# Patient Record
Sex: FEMALE | Race: WHITE | NOT HISPANIC OR LATINO | Employment: OTHER | URBAN - METROPOLITAN AREA
[De-identification: names, ages, dates, MRNs, and addresses within clinical notes are randomized per-mention and may not be internally consistent; named-entity substitution may affect disease eponyms.]

---

## 2023-04-24 ENCOUNTER — PATIENT OUTREACH (OUTPATIENT)
Dept: CARE COORDINATION | Facility: CLINIC | Age: 78
End: 2023-04-24
Payer: MEDICARE

## 2023-04-24 ENCOUNTER — DOCUMENTATION (OUTPATIENT)
Dept: PRIMARY CARE | Facility: CLINIC | Age: 78
End: 2023-04-24
Payer: MEDICARE

## 2023-04-24 RX ORDER — FUROSEMIDE 40 MG/1
40 TABLET ORAL DAILY
COMMUNITY

## 2023-07-01 LAB
ALBUMIN (G/DL) IN SER/PLAS: 3.6 G/DL (ref 3.4–5)
ANION GAP IN SER/PLAS: 17 MMOL/L (ref 10–20)
CALCIUM (MG/DL) IN SER/PLAS: 8.9 MG/DL (ref 8.6–10.3)
CARBON DIOXIDE, TOTAL (MMOL/L) IN SER/PLAS: 27 MMOL/L (ref 21–32)
CHLORIDE (MMOL/L) IN SER/PLAS: 100 MMOL/L (ref 98–107)
CREATININE (MG/DL) IN SER/PLAS: 1.48 MG/DL (ref 0.5–1.05)
GFR FEMALE: 36 ML/MIN/1.73M2
GLUCOSE (MG/DL) IN SER/PLAS: 231 MG/DL (ref 74–99)
PHOSPHATE (MG/DL) IN SER/PLAS: 1.9 MG/DL (ref 2.5–4.9)
POTASSIUM (MMOL/L) IN SER/PLAS: 3.6 MMOL/L (ref 3.5–5.3)
SODIUM (MMOL/L) IN SER/PLAS: 140 MMOL/L (ref 136–145)
UREA NITROGEN (MG/DL) IN SER/PLAS: 34 MG/DL (ref 6–23)

## 2024-01-23 ENCOUNTER — TELEMEDICINE (OUTPATIENT)
Dept: GASTROENTEROLOGY | Facility: CLINIC | Age: 79
End: 2024-01-23
Payer: MEDICARE

## 2024-01-23 DIAGNOSIS — K21.9 GASTROESOPHAGEAL REFLUX DISEASE WITHOUT ESOPHAGITIS: ICD-10-CM

## 2024-01-23 DIAGNOSIS — K74.60 CIRRHOSIS OF LIVER WITHOUT ASCITES, UNSPECIFIED HEPATIC CIRRHOSIS TYPE (MULTI): Primary | ICD-10-CM

## 2024-01-23 PROCEDURE — 99213 OFFICE O/P EST LOW 20 MIN: CPT | Performed by: INTERNAL MEDICINE

## 2024-01-23 ASSESSMENT — ENCOUNTER SYMPTOMS
DECREASED APPETITE: 0
SHORTNESS OF BREATH: 0
ABDOMINAL PAIN: 0

## 2024-01-23 NOTE — PROGRESS NOTES
REASON FOR VISIT:  follow up for cirrhosis, colon polyps, GERD    HPI:  Krea Gonsales is a 78 y.o. female who presents for    Hx of PANIAGUA Cirrhosis, colon polyps , GERD  She has fallen in the past month  - she had one episode of where she felt dizzy after trying to drink liquid - she reports she was drinking a lot when it happened - she has had some regurgitation. Advised to drink a few sips at time    She had blood work drawn at [facility] [place] -  not avail to view    MRI pancreas 06/2023- no new liver lesions, Pancreas stable         Med list notable for    Labs notable for    No fhx of CRC.       Prev endoscopic eval: colonoscopy 06/2015    REVIEW OF SYSTEMS    Review of Systems   Constitutional: Negative for decreased appetite.   Cardiovascular:  Negative for chest pain.   Respiratory:  Negative for shortness of breath.    Gastrointestinal:  Negative for abdominal pain.          Not on File    Past Medical History:   Diagnosis Date    Acute bronchitis, unspecified 01/13/2021    Acute bronchitis    Acute sinusitis, unspecified 04/14/2020    Acute sinusitis    Cervicalgia 10/15/2019    Neck pain    Decreased white blood cell count, unspecified     Leukocytopenia    Deficiency of other specified B group vitamins 05/02/2022    Vitamin B12 deficiency    Essential (primary) hypertension 05/02/2022    Benign essential hypertension    Hyperlipidemia, unspecified 05/02/2022    Hyperlipidemia    Hypersecretion of calcitonin     Hypercalcitonemia    Laceration without foreign body of unspecified hand, initial encounter     Laceration of hand    Nonalcoholic steatohepatitis (PANIAGUA)     Steatohepatitis, nonalcoholic    Obesity, unspecified 02/19/2013    Obesity    Other fatigue 09/18/2020    Fatigue, unspecified type    Other muscle spasm 11/02/2016    Muscle spasm    Pain in unspecified knee 02/19/2013    Joint pain, knee    Personal history of colonic polyps     History of adenomatous polyp of colon    Personal history of  diseases of the skin and subcutaneous tissue     History of eczema    Personal history of other diseases of the digestive system     History of hiatal hernia    Personal history of other diseases of urinary system     History of hematuria    Personal history of other endocrine, nutritional and metabolic disease     History of hyperglycemia    Personal history of other infectious and parasitic diseases     History of hepatitis    Personal history of other mental and behavioral disorders 06/03/2014    History of attention deficit disorder    Personal history of other specified conditions     History of splenomegaly    Personal history of peptic ulcer disease     Personal history of gastric ulcer    Sciatica, unspecified side 02/19/2013    Sciatica    Strain of muscle, fascia and tendon of lower back, initial encounter 06/03/2014    Lumbar strain    Syncope and collapse     Vasovagal syncope    Type 2 diabetes mellitus without complications (CMS/[facility]) 05/02/2022    Diabetes mellitus    Unspecified macular degeneration     Macular degeneration    Vitamin D deficiency, unspecified 05/02/2022    Vitamin D deficiency       Past Surgical History:   Procedure Laterality Date    CHOLECYSTECTOMY  04/23/2014    Cholecystectomy    DILATION AND CURETTAGE OF UTERUS  06/03/2014    Dilation And Curettage    OTHER SURGICAL HISTORY  06/03/2014    Biopsy Of Liver    TONSILLECTOMY  04/23/2014    Tonsillectomy    TOTAL ABDOMINAL HYSTERECTOMY  04/23/2014    Total Abdominal Hysterectomy    TOTAL ABDOMINAL HYSTERECTOMY W/ BILATERAL SALPINGOOPHORECTOMY  04/23/2014    Salpingo-oophorectomy Bilateral       No family history on file.    Social History     Tobacco Use    Smoking status: Not on file    Smokeless tobacco: Not on file   Substance Use Topics    Alcohol use: Not on file       Current Outpatient Medications   Medication Sig Dispense Refill    furosemide (Lasix) 40 mg tablet Take 1 tablet (40 mg) by mouth once daily.       No current  facility-administered medications for this visit.       PHYSICAL EXAM:  There were no vitals taken for this visit.     Physical Exam     ASSESSMENT  Cirrhosis - she has PANIAGUA - stable compensated disease. She is due for AFP and US for cirrhosis screening.  She can hold off on further colon cancer surveillance - last one 2015, EGD 2018    Spoke on the phone -visit for 12 minutes      Evaluation requested by Dr. Christie Baez MD.   My final recommendations will be communicated back to the requesting physician by way of shared Medical record or letter to requesting physician via fax.      Attending Note:   I have personally performed a face to face assessment of this Patient, which included an interview, physical exam and Assessment and Plan.  I have reviewed and confirmed the history and key findings as documented by the Medical Assistant and edited as appropriate.     Signature: Alina Esteves MD    Date: 1/23/2024  Time: 7:49 AM

## 2024-03-05 ENCOUNTER — APPOINTMENT (OUTPATIENT)
Dept: RADIOLOGY | Facility: CLINIC | Age: 79
End: 2024-03-05
Payer: MEDICARE

## 2024-03-12 ENCOUNTER — HOSPITAL ENCOUNTER (OUTPATIENT)
Dept: RADIOLOGY | Facility: CLINIC | Age: 79
Discharge: HOME | End: 2024-03-12
Payer: MEDICARE

## 2024-03-12 DIAGNOSIS — K74.60 CIRRHOSIS OF LIVER WITHOUT ASCITES, UNSPECIFIED HEPATIC CIRRHOSIS TYPE (MULTI): ICD-10-CM

## 2024-03-12 PROCEDURE — 76705 ECHO EXAM OF ABDOMEN: CPT

## 2024-03-12 PROCEDURE — 76705 ECHO EXAM OF ABDOMEN: CPT | Performed by: RADIOLOGY

## 2024-03-15 ENCOUNTER — TELEPHONE (OUTPATIENT)
Dept: GASTROENTEROLOGY | Facility: CLINIC | Age: 79
End: 2024-03-15
Payer: MEDICARE

## 2024-03-15 NOTE — TELEPHONE ENCOUNTER
----- Message from Alina ARTEAGA MD sent at 3/14/2024  3:28 PM EDT -----  Please call the patient regarding her US result. No evidence of liver lesions - repeat in 6 months to a year - keep appt

## 2024-07-09 PROBLEM — I10 BENIGN ESSENTIAL HYPERTENSION: Status: ACTIVE | Noted: 2021-03-13

## 2024-07-09 PROBLEM — L70.9 ACNE: Status: ACTIVE | Noted: 2024-07-09

## 2024-07-09 PROBLEM — N39.0 URINARY TRACT INFECTION: Status: ACTIVE | Noted: 2024-07-09

## 2024-07-09 PROBLEM — E53.8 COBALAMIN DEFICIENCY: Status: ACTIVE | Noted: 2024-07-09

## 2024-07-09 PROBLEM — I10 HYPERTENSION: Status: ACTIVE | Noted: 2024-07-09

## 2024-07-09 PROBLEM — R50.9 FEVER: Status: ACTIVE | Noted: 2024-07-09

## 2024-07-09 PROBLEM — M15.9 GENERALIZED OSTEOARTHRITIS OF MULTIPLE SITES: Status: ACTIVE | Noted: 2024-07-09

## 2024-07-09 PROBLEM — R94.30 ELEVATED RIGHT VENTRICULAR END-DIASTOLIC PRESSURE: Status: ACTIVE | Noted: 2024-07-09

## 2024-07-09 PROBLEM — M62.838 MUSCLE SPASM: Status: ACTIVE | Noted: 2024-07-09

## 2024-07-09 PROBLEM — E07.0: Status: ACTIVE | Noted: 2024-07-09

## 2024-07-09 PROBLEM — K75.81 NONALCOHOLIC STEATOHEPATITIS (NASH): Status: ACTIVE | Noted: 2021-03-13

## 2024-07-09 PROBLEM — H81.10 BENIGN POSITIONAL VERTIGO: Status: ACTIVE | Noted: 2024-07-09

## 2024-07-09 PROBLEM — M62.81 MUSCLE WEAKNESS (GENERALIZED): Status: ACTIVE | Noted: 2021-03-13

## 2024-07-09 PROBLEM — Q76.1 CERVICAL FUSION SYNDROME: Status: ACTIVE | Noted: 2024-07-09

## 2024-07-09 PROBLEM — I82.210: Status: ACTIVE | Noted: 2021-03-13

## 2024-07-09 PROBLEM — R53.81 MALAISE AND FATIGUE: Status: ACTIVE | Noted: 2024-07-09

## 2024-07-09 PROBLEM — M79.671 PAIN OF RIGHT HEEL: Status: ACTIVE | Noted: 2024-07-09

## 2024-07-09 PROBLEM — R20.0 NUMBNESS OF FACE: Status: ACTIVE | Noted: 2024-07-09

## 2024-07-09 PROBLEM — E55.9 VITAMIN D DEFICIENCY: Status: ACTIVE | Noted: 2021-03-13

## 2024-07-09 PROBLEM — R16.1 SPLENOMEGALY: Status: ACTIVE | Noted: 2024-07-09

## 2024-07-09 PROBLEM — Z86.010 HISTORY OF ADENOMATOUS POLYP OF COLON: Status: ACTIVE | Noted: 2024-07-09

## 2024-07-09 PROBLEM — R19.7 DIARRHEA: Status: ACTIVE | Noted: 2024-07-09

## 2024-07-09 PROBLEM — N18.4 CKD (CHRONIC KIDNEY DISEASE), STAGE IV (MULTI): Status: ACTIVE | Noted: 2024-07-09

## 2024-07-09 PROBLEM — G93.31 POSTVIRAL FATIGUE SYNDROME: Status: ACTIVE | Noted: 2024-07-09

## 2024-07-09 PROBLEM — E78.00 HIGH CHOLESTEROL: Status: ACTIVE | Noted: 2024-07-09

## 2024-07-09 PROBLEM — I99.8 VASCULAR INSUFFICIENCY: Status: ACTIVE | Noted: 2024-07-09

## 2024-07-09 PROBLEM — R63.0 LACK OF APPETITE: Status: ACTIVE | Noted: 2024-07-09

## 2024-07-09 PROBLEM — M54.2 NECK PAIN: Status: ACTIVE | Noted: 2024-07-09

## 2024-07-09 PROBLEM — M19.90 ARTHRITIS: Status: ACTIVE | Noted: 2024-07-09

## 2024-07-09 PROBLEM — R22.0 SCALP LUMP: Status: ACTIVE | Noted: 2024-07-09

## 2024-07-09 PROBLEM — J82.83 EOSINOPHILIC ASTHMA (HHS-HCC): Status: ACTIVE | Noted: 2024-07-09

## 2024-07-09 PROBLEM — R31.9 BLOOD IN URINE: Status: ACTIVE | Noted: 2024-07-09

## 2024-07-09 PROBLEM — K21.9 GERD (GASTROESOPHAGEAL REFLUX DISEASE): Status: ACTIVE | Noted: 2024-07-09

## 2024-07-09 PROBLEM — R30.0 DYSURIA: Status: ACTIVE | Noted: 2024-07-09

## 2024-07-09 PROBLEM — R68.2 DRY MOUTH: Status: ACTIVE | Noted: 2024-07-09

## 2024-07-09 PROBLEM — R25.1 TREMOR OF LEFT HAND: Status: ACTIVE | Noted: 2024-07-09

## 2024-07-09 PROBLEM — S80.01XA CONTUSION OF RIGHT KNEE: Status: ACTIVE | Noted: 2021-03-13

## 2024-07-09 PROBLEM — M54.41 CHRONIC LOW BACK PAIN WITH RIGHT-SIDED SCIATICA: Status: ACTIVE | Noted: 2024-07-09

## 2024-07-09 PROBLEM — N18.4: Status: ACTIVE | Noted: 2024-01-04

## 2024-07-09 PROBLEM — S39.012A LUMBAR STRAIN: Status: ACTIVE | Noted: 2024-07-09

## 2024-07-09 PROBLEM — Z86.0101 HISTORY OF ADENOMATOUS POLYP OF COLON: Status: ACTIVE | Noted: 2024-07-09

## 2024-07-09 PROBLEM — I25.10 NONOBSTRUCTIVE ATHEROSCLEROSIS OF CORONARY ARTERY: Status: ACTIVE | Noted: 2024-07-09

## 2024-07-09 PROBLEM — R13.10 DYSPHAGIA: Status: ACTIVE | Noted: 2024-07-09

## 2024-07-09 PROBLEM — R07.89 PRESSURE IN LEFT SIDE OF CHEST: Status: ACTIVE | Noted: 2024-07-09

## 2024-07-09 PROBLEM — R53.83 MALAISE AND FATIGUE: Status: ACTIVE | Noted: 2024-07-09

## 2024-07-09 PROBLEM — S20.211A CONTUSION OF RIB ON RIGHT SIDE: Status: ACTIVE | Noted: 2024-07-09

## 2024-07-09 PROBLEM — M25.559 ARTHRALGIA OF HIP: Status: ACTIVE | Noted: 2024-07-09

## 2024-07-09 PROBLEM — R92.8 FOLLOW-UP EXAMINATION OF ABNORMAL MAMMOGRAM: Status: ACTIVE | Noted: 2024-07-09

## 2024-07-09 PROBLEM — H35.30 MACULAR DEGENERATION: Status: ACTIVE | Noted: 2024-07-09

## 2024-07-09 PROBLEM — A04.5 CAMPYLOBACTER DIARRHEA: Status: ACTIVE | Noted: 2024-07-09

## 2024-07-09 PROBLEM — R60.0 EDEMA OF LOWER EXTREMITY: Status: ACTIVE | Noted: 2024-07-09

## 2024-07-09 PROBLEM — H26.9 CATARACT OF BOTH EYES: Status: ACTIVE | Noted: 2024-07-09

## 2024-07-09 PROBLEM — M94.251: Status: ACTIVE | Noted: 2024-07-09

## 2024-07-09 PROBLEM — R23.3 BRUISES EASILY: Status: ACTIVE | Noted: 2024-07-09

## 2024-07-09 PROBLEM — M79.621 PAIN IN RIGHT AXILLA: Status: ACTIVE | Noted: 2024-07-09

## 2024-07-09 PROBLEM — I27.0 PRIMARY PULMONARY HYPERTENSION (MULTI): Status: ACTIVE | Noted: 2024-07-09

## 2024-07-09 PROBLEM — I50.9 CONGESTIVE HEART FAILURE (MULTI): Status: ACTIVE | Noted: 2023-04-28

## 2024-07-09 PROBLEM — R06.89 HEAVY BREATHING: Status: ACTIVE | Noted: 2024-07-09

## 2024-07-09 PROBLEM — D12.6 ADENOMATOUS COLON POLYP: Status: ACTIVE | Noted: 2024-07-09

## 2024-07-09 PROBLEM — D72.819 LEUKOPENIA: Status: ACTIVE | Noted: 2024-07-09

## 2024-07-09 PROBLEM — E11.9 DIABETES MELLITUS (MULTI): Status: ACTIVE | Noted: 2024-07-09

## 2024-07-09 PROBLEM — I77.9: Status: ACTIVE | Noted: 2024-07-09

## 2024-07-09 PROBLEM — R53.83 FATIGUE: Status: ACTIVE | Noted: 2024-07-09

## 2024-07-09 PROBLEM — G89.29 CHRONIC LOW BACK PAIN WITH RIGHT-SIDED SCIATICA: Status: ACTIVE | Noted: 2024-07-09

## 2024-07-09 PROBLEM — R07.9 ACUTE CHEST PAIN: Status: ACTIVE | Noted: 2024-07-09

## 2024-07-09 PROBLEM — J18.9 PNEUMONIA: Status: ACTIVE | Noted: 2024-07-09

## 2024-07-09 PROBLEM — Z86.2 HISTORY OF BLOOD DISORDER: Status: ACTIVE | Noted: 2024-07-09

## 2024-07-09 PROBLEM — H66.91 RIGHT OTITIS MEDIA: Status: ACTIVE | Noted: 2024-07-09

## 2024-07-09 PROBLEM — K76.6 PORTAL HYPERTENSION (MULTI): Status: ACTIVE | Noted: 2024-07-09

## 2024-07-09 PROBLEM — H61.21 IMPACTED CERUMEN OF RIGHT EAR: Status: ACTIVE | Noted: 2024-07-09

## 2024-07-09 PROBLEM — U07.1 PNEUMONIA DUE TO COVID-19 VIRUS: Status: ACTIVE | Noted: 2024-07-09

## 2024-07-09 PROBLEM — J20.9 ACUTE BRONCHITIS: Status: ACTIVE | Noted: 2024-07-09

## 2024-07-09 PROBLEM — Z20.822 SUSPECTED SEVERE ACUTE RESPIRATORY SYNDROME CORONAVIRUS 2 (SARS-COV-2) INFECTION: Status: ACTIVE | Noted: 2024-07-09

## 2024-07-09 PROBLEM — J12.82 PNEUMONIA DUE TO COVID-19 VIRUS: Status: ACTIVE | Noted: 2024-07-09

## 2024-07-09 PROBLEM — M48.061 LUMBAR SPINAL STENOSIS: Status: ACTIVE | Noted: 2024-07-09

## 2024-07-09 PROBLEM — I25.9 CHRONIC ISCHEMIC HEART DISEASE: Status: ACTIVE | Noted: 2024-07-09

## 2024-07-09 PROBLEM — R26.2 DIFFICULTY IN WALKING, NOT ELSEWHERE CLASSIFIED: Status: ACTIVE | Noted: 2021-03-13

## 2024-07-09 PROBLEM — S89.91XD: Status: ACTIVE | Noted: 2021-03-13

## 2024-07-09 PROBLEM — H61.23 BILATERAL HEARING LOSS DUE TO CERUMEN IMPACTION: Status: ACTIVE | Noted: 2024-07-09

## 2024-07-09 PROBLEM — S61.419A LACERATION OF HAND: Status: ACTIVE | Noted: 2024-07-09

## 2024-07-09 PROBLEM — S80.10XA CONTUSION OF LOWER EXTREMITY: Status: ACTIVE | Noted: 2024-07-09

## 2024-07-09 PROBLEM — R06.09 EXERTIONAL DYSPNEA: Status: ACTIVE | Noted: 2024-07-09

## 2024-07-09 PROBLEM — E66.9 OBESITY: Status: ACTIVE | Noted: 2024-07-09

## 2024-07-09 PROBLEM — M85.80 OSTEOPENIA: Status: ACTIVE | Noted: 2024-07-09

## 2024-07-09 PROBLEM — R60.0 EDEMA OF BOTH LEGS: Status: ACTIVE | Noted: 2024-07-09

## 2024-07-09 PROBLEM — I83.893 VARICOSE VEINS OF BILATERAL LOWER EXTREMITIES WITH OTHER COMPLICATIONS: Status: ACTIVE | Noted: 2024-07-09

## 2024-07-09 PROBLEM — E86.0 DEHYDRATION: Status: ACTIVE | Noted: 2024-07-09

## 2024-07-09 PROBLEM — D69.6 LOW PLATELET COUNT (CMS-HCC): Status: ACTIVE | Noted: 2024-07-09

## 2024-07-09 PROBLEM — T50.A95A: Status: ACTIVE | Noted: 2024-07-09

## 2024-07-09 PROBLEM — H91.90 HEARING LOSS: Status: ACTIVE | Noted: 2024-07-09

## 2024-07-09 PROBLEM — I25.10 CORONARY ARTERY DISEASE: Status: ACTIVE | Noted: 2024-07-09

## 2024-07-09 PROBLEM — K59.00 CONSTIPATION: Status: ACTIVE | Noted: 2024-07-09

## 2024-07-09 PROBLEM — Z86.16 HISTORY OF COVID-19: Status: ACTIVE | Noted: 2024-07-09

## 2024-07-09 PROBLEM — R55 VASOVAGAL SYNCOPE: Status: ACTIVE | Noted: 2024-07-09

## 2024-07-09 PROBLEM — R25.1 INTERMITTENT TREMOR: Status: ACTIVE | Noted: 2024-07-09

## 2024-07-29 NOTE — PROGRESS NOTES
REASON FOR VISIT:  Cirrhosis, colon polyps , GERD      HPI:  Kera Gonsales is a 79 y.o. female who presents for follow up     Hx of PANIAGUA , colon polyps, GERD - imaging at recent hospitalization was ok - no liver lesions, +varices  She is continuing to have some regurgitation with liquids   Pt takes tums for GERD daily  She is on farxiga for blood sugar control - this is causing some constipation - may contribute to weight loss  Currently moving bowels 1x per day - she takes 1 capful of  miralax daily   Denies BRB, melena or mucous   Recent fall - admitted to [facility] 7/22        Med list notable for    Labs notable for    No fhx of CRC.       Prev endoscopic eval:   Colonoscopy - 2015  EGD 2018     REVIEW OF SYSTEMS    Review of Systems   Cardiovascular:  Negative for chest pain.   Respiratory:  Negative for cough and shortness of breath.    Gastrointestinal:  Positive for abdominal pain, constipation, dysphagia and heartburn. Negative for bloating, diarrhea, hematemesis, hematochezia, hemorrhoids, jaundice, melena, nausea and vomiting.          Allergies   Allergen Reactions    Amoxicillin Other    Codeine GI Upset    Cortisone Rash       Past Medical History:   Diagnosis Date    Acute bronchitis, unspecified 01/13/2021    Acute bronchitis    Acute sinusitis, unspecified 04/14/2020    Acute sinusitis    Cervicalgia 10/15/2019    Neck pain    Decreased white blood cell count, unspecified     Leukocytopenia    Deficiency of other specified B group vitamins 05/02/2022    Vitamin B12 deficiency    Essential (primary) hypertension 05/02/2022    Benign essential hypertension    Hyperlipidemia, unspecified 05/02/2022    Hyperlipidemia    Hypersecretion of calcitonin     Hypercalcitonemia    Laceration without foreign body of unspecified hand, initial encounter     Laceration of hand    Nonalcoholic steatohepatitis (PANIAGUA)     Steatohepatitis, nonalcoholic    Obesity, unspecified 02/19/2013    Obesity     Other fatigue 09/18/2020    Fatigue, unspecified type    Other muscle spasm 11/02/2016    Muscle spasm    Pain in unspecified knee 02/19/2013    Joint pain, knee    Personal history of colonic polyps     History of adenomatous polyp of colon    Personal history of diseases of the skin and subcutaneous tissue     History of eczema    Personal history of other diseases of the digestive system     History of hiatal hernia    Personal history of other diseases of urinary system     History of hematuria    Personal history of other endocrine, nutritional and metabolic disease     History of hyperglycemia    Personal history of other infectious and parasitic diseases     History of hepatitis    Personal history of other mental and behavioral disorders 06/03/2014    History of attention deficit disorder    Personal history of other specified conditions     History of splenomegaly    Personal history of peptic ulcer disease     Personal history of gastric ulcer    Sciatica, unspecified side 02/19/2013    Sciatica    Strain of muscle, fascia and tendon of lower back, initial encounter 06/03/2014    Lumbar strain    Syncope and collapse     Vasovagal syncope    Type 2 diabetes mellitus without complications (Multi) 05/02/2022    Diabetes mellitus    Unspecified macular degeneration     Macular degeneration    Vitamin D deficiency, unspecified 05/02/2022    Vitamin D deficiency       Past Surgical History:   Procedure Laterality Date    CHOLECYSTECTOMY  04/23/2014    Cholecystectomy    DILATION AND CURETTAGE OF UTERUS  06/03/2014    Dilation And Curettage    OTHER SURGICAL HISTORY  06/03/2014    Biopsy Of Liver    TONSILLECTOMY  04/23/2014    Tonsillectomy    TOTAL ABDOMINAL HYSTERECTOMY  04/23/2014    Total Abdominal Hysterectomy    TOTAL ABDOMINAL HYSTERECTOMY W/ BILATERAL SALPINGOOPHORECTOMY  04/23/2014    Salpingo-oophorectomy Bilateral       No family history on file.    Social History     Tobacco Use    Smoking status:  Not on file    Smokeless tobacco: Not on file   Substance Use Topics    Alcohol use: Not on file       Current Outpatient Medications   Medication Sig Dispense Refill    furosemide (Lasix) 40 mg tablet Take 1 tablet (40 mg) by mouth once daily.       No current facility-administered medications for this visit.       PHYSICAL EXAM:  There were no vitals taken for this visit.     Physical Exam  Constitutional:       Comments: Awake   HENT:      Head: Normocephalic.   Cardiovascular:      Rate and Rhythm: Normal rate and regular rhythm.   Pulmonary:      Effort: Pulmonary effort is normal.      Breath sounds: Normal breath sounds.   Abdominal:      General: Bowel sounds are normal.      Palpations: Abdomen is soft.   Neurological:      Mental Status: She is alert.   Psychiatric:         Mood and Affect: Mood normal.          ASSESSMENT  79-year-old female presents for follow-up of longstanding [place] cirrhosis and history of colon polyps and heartburn.  She has previously been diagnosed with pulmonary hypertension.  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  She currently lives at home alone but has neighbors that keeps track of her.  She fell trying to reach for something at night and fell on her walker.  She has multiple bruises across her face chin and ear.  She was recently admitted to the hospital at [facility] for possible pneumonia.  Her cirrhosis seems to be well compensated.  Labs and imaging were unremarkable at the outside hospital.  She has lost some weight which she attributes to being on the Farxiga and eating better.  I am concerned about her living alone but she does not want to address any other living situations.  She we will continue her diet.  I have asked her to increase her medicine to omeprazole 20 mg rather than Tums daily since she is having some regurgitation and heartburn symptoms.  I will see her in follow-up in 6 months      Evaluation requested by Dr. Christie Baez MD.   My final  recommendations will be communicated back to the requesting physician by way of shared Medical record or letter to requesting physician via fax.      Attending Note:   I have personally performed a face to face assessment of this Patient, which included an interview, physical exam and Assessment and Plan.  I have reviewed and confirmed the history and key findings as documented by the Medical Assistant and edited as appropriate.     Signature: Alina Esteves MD    Date: 7/29/2024  Time: 10:19 AM

## 2024-07-30 ENCOUNTER — APPOINTMENT (OUTPATIENT)
Dept: GASTROENTEROLOGY | Facility: CLINIC | Age: 79
End: 2024-07-30
Payer: MEDICARE

## 2024-07-30 VITALS
HEART RATE: 72 BPM | BODY MASS INDEX: 23.73 KG/M2 | RESPIRATION RATE: 18 BRPM | DIASTOLIC BLOOD PRESSURE: 64 MMHG | SYSTOLIC BLOOD PRESSURE: 137 MMHG | TEMPERATURE: 98.8 F | HEIGHT: 64 IN | WEIGHT: 139 LBS | OXYGEN SATURATION: 94 %

## 2024-07-30 DIAGNOSIS — K59.03 DRUG-INDUCED CONSTIPATION: ICD-10-CM

## 2024-07-30 DIAGNOSIS — K74.60 CIRRHOSIS OF LIVER WITHOUT ASCITES, UNSPECIFIED HEPATIC CIRRHOSIS TYPE (MULTI): Primary | ICD-10-CM

## 2024-07-30 DIAGNOSIS — K21.9 GASTROESOPHAGEAL REFLUX DISEASE WITHOUT ESOPHAGITIS: ICD-10-CM

## 2024-07-30 PROCEDURE — 3075F SYST BP GE 130 - 139MM HG: CPT | Performed by: INTERNAL MEDICINE

## 2024-07-30 PROCEDURE — 1036F TOBACCO NON-USER: CPT | Performed by: INTERNAL MEDICINE

## 2024-07-30 PROCEDURE — 99214 OFFICE O/P EST MOD 30 MIN: CPT | Performed by: INTERNAL MEDICINE

## 2024-07-30 PROCEDURE — 3078F DIAST BP <80 MM HG: CPT | Performed by: INTERNAL MEDICINE

## 2024-07-30 PROCEDURE — 1159F MED LIST DOCD IN RCRD: CPT | Performed by: INTERNAL MEDICINE

## 2024-07-30 RX ORDER — SPIRONOLACTONE 50 MG/1
TABLET, FILM COATED ORAL
COMMUNITY
Start: 2024-07-02

## 2024-07-30 RX ORDER — DAPAGLIFLOZIN 10 MG/1
10 TABLET, FILM COATED ORAL
COMMUNITY
Start: 2024-07-13

## 2024-07-30 RX ORDER — SILDENAFIL CITRATE 20 MG/1
2 TABLET ORAL
COMMUNITY
Start: 2024-07-08

## 2024-07-30 RX ORDER — OMEPRAZOLE 20 MG/1
20 CAPSULE, DELAYED RELEASE ORAL
Qty: 90 CAPSULE | Refills: 3 | Status: SHIPPED | OUTPATIENT
Start: 2024-07-30 | End: 2025-07-30

## 2024-07-30 RX ORDER — TORSEMIDE 20 MG/1
TABLET ORAL
COMMUNITY
Start: 2024-07-08

## 2024-07-30 RX ORDER — MONTELUKAST SODIUM 10 MG/1
TABLET ORAL
COMMUNITY
Start: 2024-07-02

## 2024-07-30 ASSESSMENT — ENCOUNTER SYMPTOMS
DIARRHEA: 0
HEMATEMESIS: 0
HEARTBURN: 1
VOMITING: 0
BLOATING: 0
JAUNDICE: 0
CONSTIPATION: 1
SHORTNESS OF BREATH: 0
ABDOMINAL PAIN: 1
COUGH: 0
NAUSEA: 0
HEMATOCHEZIA: 0

## 2024-09-24 ENCOUNTER — TELEPHONE (OUTPATIENT)
Dept: GASTROENTEROLOGY | Facility: CLINIC | Age: 79
End: 2024-09-24
Payer: MEDICARE

## 2024-09-24 NOTE — TELEPHONE ENCOUNTER
Patient called in. She was in [facility] ER. She wants to know if you should see her sooner than January?

## 2024-10-09 ENCOUNTER — TELEPHONE (OUTPATIENT)
Dept: GASTROENTEROLOGY | Facility: CLINIC | Age: 79
End: 2024-10-09
Payer: MEDICARE

## 2024-12-26 PROBLEM — I50.33 ACUTE ON CHRONIC DIASTOLIC (CONGESTIVE) HEART FAILURE: Status: ACTIVE | Noted: 2024-12-26

## 2024-12-26 PROBLEM — Z59.6 LOW INCOME: Status: ACTIVE | Noted: 2024-12-26

## 2024-12-26 PROBLEM — K52.9 COLITIS: Status: ACTIVE | Noted: 2024-09-22

## 2024-12-26 PROBLEM — M79.674 PAIN IN TOES OF BOTH FEET: Status: ACTIVE | Noted: 2024-12-26

## 2024-12-26 PROBLEM — K57.92 DIVERTICULITIS: Status: ACTIVE | Noted: 2024-09-22

## 2024-12-26 PROBLEM — E11.29: Status: ACTIVE | Noted: 2024-12-26

## 2024-12-26 PROBLEM — L60.8 ACQUIRED DEFORMITY OF NAIL: Status: ACTIVE | Noted: 2024-12-26

## 2024-12-26 PROBLEM — M18.10 DEGENERATIVE ARTHRITIS OF THUMB: Status: ACTIVE | Noted: 2024-12-26

## 2024-12-26 PROBLEM — M79.675 PAIN IN TOES OF BOTH FEET: Status: ACTIVE | Noted: 2024-12-26

## 2024-12-26 PROBLEM — B35.1 ONYCHOMYCOSIS DUE TO DERMATOPHYTE: Status: ACTIVE | Noted: 2024-12-26

## 2024-12-26 PROBLEM — G93.2 BENIGN INTRACRANIAL HYPERTENSION: Status: ACTIVE | Noted: 2024-12-26

## 2024-12-26 PROBLEM — M47.816 LUMBAR SPONDYLOSIS: Status: ACTIVE | Noted: 2024-12-26

## 2024-12-26 PROBLEM — E11.42 POLYNEUROPATHY DUE TO TYPE 2 DIABETES MELLITUS (MULTI): Status: ACTIVE | Noted: 2024-12-26
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
## 2024-12-26 RX ORDER — LANCETS
EACH MISCELLANEOUS
COMMUNITY
Start: 2024-06-22

## 2024-12-26 RX ORDER — ATENOLOL 25 MG/1
TABLET ORAL
COMMUNITY
Start: 2024-07-02

## 2024-12-26 RX ORDER — BLOOD SUGAR DIAGNOSTIC
STRIP MISCELLANEOUS
COMMUNITY
Start: 2024-06-22

## 2024-12-26 RX ORDER — POLYETHYLENE GLYCOL 3350 17 G/17G
POWDER, FOR SOLUTION ORAL
COMMUNITY
Start: 2024-06-25

## 2024-12-26 RX ORDER — LACTULOSE 10 G/15ML
SOLUTION ORAL
COMMUNITY
Start: 2024-05-10

## 2024-12-26 RX ORDER — GLIPIZIDE 5 MG/1
5 TABLET ORAL
COMMUNITY
Start: 2024-08-08 | End: 2025-02-24

## 2025-01-28 ENCOUNTER — APPOINTMENT (OUTPATIENT)
Dept: GASTROENTEROLOGY | Facility: CLINIC | Age: 80
End: 2025-01-28
Payer: MEDICARE

## 2025-01-28 VITALS
OXYGEN SATURATION: 96 % | HEIGHT: 64 IN | HEART RATE: 75 BPM | TEMPERATURE: 96.8 F | DIASTOLIC BLOOD PRESSURE: 58 MMHG | BODY MASS INDEX: 27.49 KG/M2 | SYSTOLIC BLOOD PRESSURE: 126 MMHG | RESPIRATION RATE: 22 BRPM | WEIGHT: 161 LBS

## 2025-01-28 DIAGNOSIS — K75.81 NONALCOHOLIC STEATOHEPATITIS (NASH): ICD-10-CM

## 2025-01-28 DIAGNOSIS — K74.69 OTHER CIRRHOSIS OF LIVER: Primary | ICD-10-CM

## 2025-01-28 DIAGNOSIS — K21.9 GASTROESOPHAGEAL REFLUX DISEASE WITHOUT ESOPHAGITIS: ICD-10-CM

## 2025-01-28 PROCEDURE — G2211 COMPLEX E/M VISIT ADD ON: HCPCS | Performed by: INTERNAL MEDICINE

## 2025-01-28 PROCEDURE — 1159F MED LIST DOCD IN RCRD: CPT | Performed by: INTERNAL MEDICINE

## 2025-01-28 PROCEDURE — 3078F DIAST BP <80 MM HG: CPT | Performed by: INTERNAL MEDICINE

## 2025-01-28 PROCEDURE — 99214 OFFICE O/P EST MOD 30 MIN: CPT | Performed by: INTERNAL MEDICINE

## 2025-01-28 PROCEDURE — 3074F SYST BP LT 130 MM HG: CPT | Performed by: INTERNAL MEDICINE

## 2025-01-28 ASSESSMENT — ENCOUNTER SYMPTOMS
COUGH: 0
CONSTIPATION: 0
WEIGHT LOSS: 0
BLOATING: 0
HEMATOCHEZIA: 0
HEARTBURN: 0
SHORTNESS OF BREATH: 0
WEIGHT GAIN: 1
HEMATEMESIS: 0
ABDOMINAL PAIN: 0
DIARRHEA: 0
VOMITING: 0
NAUSEA: 0
DECREASED APPETITE: 0

## 2025-01-28 NOTE — PROGRESS NOTES
REASON FOR VISIT:  PANIAGUA, colon polyps, GERD   HPI:  Kera Gonsales is a 79 y.o. female who presents for a follow up appointment     She was admitted to [facility] x 3 days for constipation . She was disimpacted   Currently moving bowels daily if she takes miralax .   Denies BRB, melena, or mucous   Denies abdominal pain   Lower extremity swelling  Denies heartburn symptoms - takes omeprazole prn          Med list notable for miralax , omeprazole prn     Labs notable for    No fhx of CRC.       Prev endoscopic eval:   Colonoscopy - 2015  EGD 2018   REVIEW OF SYSTEMS    Review of Systems   Constitutional: Positive for weight gain. Negative for decreased appetite and weight loss.   Cardiovascular:  Negative for chest pain.   Respiratory:  Negative for cough and shortness of breath.    Gastrointestinal:  Negative for bloating, abdominal pain, constipation, diarrhea, dysphagia, heartburn, hematemesis, hematochezia, hemorrhoids, nausea and vomiting.          Allergies   Allergen Reactions    Amoxicillin Other    Codeine GI Upset    Nitroglycerin Other     Bradycardia     Cortisone Rash    Latex Rash       Past Medical History:   Diagnosis Date    Acute bronchitis, unspecified 01/13/2021    Acute bronchitis    Acute sinusitis, unspecified 04/14/2020    Acute sinusitis    Cervicalgia 10/15/2019    Neck pain    Decreased white blood cell count, unspecified     Leukocytopenia    Deficiency of other specified B group vitamins 05/02/2022    Vitamin B12 deficiency    Essential (primary) hypertension 05/02/2022    Benign essential hypertension    Hyperlipidemia, unspecified 05/02/2022    Hyperlipidemia    Hypersecretion of calcitonin     Hypercalcitonemia    Laceration without foreign body of unspecified hand, initial encounter     Laceration of hand    Nonalcoholic steatohepatitis (PANIAGUA)     Steatohepatitis, nonalcoholic    Obesity, unspecified 02/19/2013    Obesity    Other fatigue 09/18/2020    Fatigue,  unspecified type    Other muscle spasm 11/02/2016    Muscle spasm    Pain in unspecified knee 02/19/2013    Joint pain, knee    Personal history of colonic polyps     History of adenomatous polyp of colon    Personal history of diseases of the skin and subcutaneous tissue     History of eczema    Personal history of other diseases of the digestive system     History of hiatal hernia    Personal history of other diseases of urinary system     History of hematuria    Personal history of other endocrine, nutritional and metabolic disease     History of hyperglycemia    Personal history of other infectious and parasitic diseases     History of hepatitis    Personal history of other mental and behavioral disorders 06/03/2014    History of attention deficit disorder    Personal history of other specified conditions     History of splenomegaly    Personal history of peptic ulcer disease     Personal history of gastric ulcer    Sciatica, unspecified side 02/19/2013    Sciatica    Strain of muscle, fascia and tendon of lower back, initial encounter 06/03/2014    Lumbar strain    Syncope and collapse     Vasovagal syncope    Type 2 diabetes mellitus without complications (Multi) 05/02/2022    Diabetes mellitus    Unspecified macular degeneration     Macular degeneration    Vitamin D deficiency, unspecified 05/02/2022    Vitamin D deficiency       Past Surgical History:   Procedure Laterality Date    CHOLECYSTECTOMY  04/23/2014    Cholecystectomy    DILATION AND CURETTAGE OF UTERUS  06/03/2014    Dilation And Curettage    OTHER SURGICAL HISTORY  06/03/2014    Biopsy Of Liver    TONSILLECTOMY  04/23/2014    Tonsillectomy    TOTAL ABDOMINAL HYSTERECTOMY  04/23/2014    Total Abdominal Hysterectomy    TOTAL ABDOMINAL HYSTERECTOMY W/ BILATERAL SALPINGOOPHORECTOMY  04/23/2014    Salpingo-oophorectomy Bilateral       No family history on file.    Social History     Tobacco Use    Smoking status: Never    Smokeless tobacco: Never    Substance Use Topics    Alcohol use: Yes     Comment: 3x per year       Current Outpatient Medications   Medication Sig Dispense Refill    Accu-Chek Bozena Plus test strp strip       Accu-Chek Softclix Lancets misc       atenolol (Tenormin) 25 mg tablet       Constulose 10 gram/15 mL oral solution take 15 mL ORAL TWICE DAILY x30 days as needed for constipation      Farxiga 10 mg 1 tablet (10 mg).      finerenone (Kerendia) 10 mg tablet tablet Take 1 tablet (10 mg) by mouth once daily.      furosemide (Lasix) 40 mg tablet Take 1 tablet (40 mg) by mouth once daily.      glipiZIDE (Glucotrol) 5 mg tablet 1 tablet (5 mg).      montelukast (Singulair) 10 mg tablet       omeprazole (PriLOSEC) 20 mg DR capsule Take 1 capsule (20 mg) by mouth once daily in the morning. Take before meals. Do not crush or chew. 90 capsule 3    polyethylene glycol (Glycolax, Miralax) 17 gram/dose powder Take 17g ORAL DAILY for 30 days,Instrdissolve in water before taking.      sildenafil (Revatio) 20 mg tablet Take 2 tablets (40 mg) by mouth 3 times a day.      spironolactone (Aldactone) 50 mg tablet       torsemide (Demadex) 20 mg tablet        No current facility-administered medications for this visit.       PHYSICAL EXAM:  There were no vitals taken for this visit.     Physical Exam  Constitutional:       Comments: Awake   HENT:      Head: Normocephalic.   Cardiovascular:      Rate and Rhythm: Normal rate and regular rhythm.   Pulmonary:      Effort: Pulmonary effort is normal.      Breath sounds: Normal breath sounds.   Abdominal:      General: Bowel sounds are normal.      Palpations: Abdomen is soft.   Neurological:      Mental Status: She is alert.   Psychiatric:         Mood and Affect: Mood normal.          ASSESSMENT  79-year-old female who has been followed in GI clinic for multiple GI issues.  She was diagnosed with [place] cirrhosis many years ago and has undergone surveillance ultrasound and alpha-fetoprotein periodically.  Her  last ultrasound was unremarkable for any liver lesions her last alpha-fetoprotein was in 2022 that was unremarkable.  She has multiple medical issues including coronary artery disease, pulmonary hypertension, chronic kidney disease recently diagnosed as stage IV but improved to stage III, diabetes, hyperlipidemia, hypertension.  She was on Farxiga which she felt was causing constipation and due to cost issues she discontinued it.  She was admitted to [facility] With obstipation that required disimpaction.  Her last colonoscopy was in 2015 and was unremarkable however she does have a history of polyps.  She was advised to start MiraLAX which has helped her constipation.  She denies other alarm symptoms  Given her multiple medical issues, living alone and using a walker I am concerned about proceeding with a colonoscopy to assess the constipation.  Was initially felt to be related to the Farxiga but since stopping it she has not seen significant improvement in her constipation.  She has noted regular bowel movements on the MiraLAX.  For now I would hold off on pursuing any invasive procedures.  We will check an alpha-fetoprotein and ultrasound given that her last one was a few years ago.    I will see her in follow-up in 6 months      Evaluation requested by Dr. Christie Baez MD.   My final recommendations will be communicated back to the requesting physician by way of shared Medical record or letter to requesting physician via fax.      Attending Note:   I have personally performed a face to face assessment of this Patient, which included an interview, physical exam and Assessment and Plan.  I have reviewed and confirmed the history and key findings as documented by the Medical Assistant and edited as appropriate.     Signature: Alina Esteves MD    Date: 1/28/2025  Time: 2:25 PM

## 2025-02-07 LAB — NON-UH HIE AFP TM: 5 NG/ML (ref 0–8)

## 2025-02-18 ENCOUNTER — TELEPHONE (OUTPATIENT)
Dept: GASTROENTEROLOGY | Facility: CLINIC | Age: 80
End: 2025-02-18
Payer: MEDICARE

## 2025-02-18 NOTE — TELEPHONE ENCOUNTER
Spoke with patient. She is aware that results of AFP are WNL. She states she will have the ultrasound at [facility] in March due to weather.

## 2025-02-18 NOTE — TELEPHONE ENCOUNTER
----- Message from Alina Esteves sent at 2/16/2025  8:53 PM EST -----  The AFP is normal. Please complete the ultrasound to complete the cirrhosis screening. Keep your follow up as scheduled.

## 2025-02-20 ENCOUNTER — APPOINTMENT (OUTPATIENT)
Dept: RADIOLOGY | Facility: CLINIC | Age: 80
End: 2025-02-20
Payer: MEDICARE

## 2025-03-19 ENCOUNTER — HOSPITAL ENCOUNTER (OUTPATIENT)
Dept: RADIOLOGY | Facility: CLINIC | Age: 80
Discharge: HOME | End: 2025-03-19
Payer: MEDICARE

## 2025-03-19 DIAGNOSIS — K74.69 OTHER CIRRHOSIS OF LIVER: ICD-10-CM

## 2025-03-19 DIAGNOSIS — K75.81 NONALCOHOLIC STEATOHEPATITIS (NASH): ICD-10-CM

## 2025-03-19 PROCEDURE — 76705 ECHO EXAM OF ABDOMEN: CPT

## 2025-03-21 ENCOUNTER — TELEPHONE (OUTPATIENT)
Dept: GASTROENTEROLOGY | Facility: CLINIC | Age: 80
End: 2025-03-21
Payer: MEDICARE

## 2025-03-21 NOTE — TELEPHONE ENCOUNTER
----- Message from Alina Esteves sent at 3/21/2025 12:40 PM EDT -----  Please call - US is stable, pancreatic cyst is stable - plan for repeat in 6 months. Keep appt as scheduled.    Ftsg Text: The defect edges were debeveled with a #15 scalpel blade.  Given the location of the defect, shape of the defect and the proximity to free margins a full thickness skin graft was deemed most appropriate.  Using a sterile surgical marker, the primary defect shape was transferred to the donor site. The area thus outlined was incised deep to adipose tissue with a #15 scalpel blade.  The harvested graft was then trimmed of adipose tissue until only dermis and epidermis was left.  The skin margins of the secondary defect were undermined to an appropriate distance in all directions utilizing iris scissors.  The secondary defect was closed with interrupted buried subcutaneous sutures.  The skin edges were then re-apposed with running  sutures.  The skin graft was then placed in the primary defect and oriented appropriately.

## 2025-04-03 ENCOUNTER — NURSING HOME VISIT (OUTPATIENT)
Dept: POST ACUTE CARE | Facility: EXTERNAL LOCATION | Age: 80
End: 2025-04-03
Payer: MEDICARE

## 2025-04-03 DIAGNOSIS — N18.30 STAGE 3 CHRONIC KIDNEY DISEASE, UNSPECIFIED WHETHER STAGE 3A OR 3B CKD (MULTI): ICD-10-CM

## 2025-04-03 DIAGNOSIS — K76.6 PORTAL HYPERTENSION (MULTI): ICD-10-CM

## 2025-04-03 DIAGNOSIS — I50.9 CONGESTIVE HEART FAILURE, UNSPECIFIED HF CHRONICITY, UNSPECIFIED HEART FAILURE TYPE: ICD-10-CM

## 2025-04-03 DIAGNOSIS — S32.82XD MULTIPLE CLOSED FRACTURES OF PELVIS WITHOUT DISRUPTION OF PELVIC RING WITH ROUTINE HEALING, SUBSEQUENT ENCOUNTER: Primary | ICD-10-CM

## 2025-04-03 DIAGNOSIS — D69.3 CHRONIC IDIOPATHIC THROMBOCYTOPENIA (MULTI): ICD-10-CM

## 2025-04-03 DIAGNOSIS — K74.69 OTHER CIRRHOSIS OF LIVER: ICD-10-CM

## 2025-04-03 DIAGNOSIS — R55 VASOVAGAL SYNCOPE: ICD-10-CM

## 2025-04-03 DIAGNOSIS — E11.9 CONTROLLED TYPE 2 DIABETES MELLITUS WITHOUT COMPLICATION, WITHOUT LONG-TERM CURRENT USE OF INSULIN: ICD-10-CM

## 2025-04-03 PROCEDURE — 99497 ADVNCD CARE PLAN 30 MIN: CPT | Performed by: EMERGENCY MEDICINE

## 2025-04-03 PROCEDURE — 99306 1ST NF CARE HIGH MDM 50: CPT | Performed by: EMERGENCY MEDICINE

## 2025-04-03 NOTE — LETTER
Patient: Kera Gonsales  : 1945    Encounter Date: 2025    Kera Gonsales   79 y.o.  female  @location@            Assessment and Plan:  History and physical    Mechanical fall with left inferior pubic rami and sacral and acetabulum fractures (Multiple pelvic fractures)  Vasovagal syncope after dulcolax suppository placed  Chest pain likely due to hypoxemia from IV narcotics  Hypotension due to narcotics  Hypokalemia  NIDDM  Chronic thrombocytopenia  Pulmonary HTN  Right heart failure  Portal hypertension  Liver cirrhosis secondary to PANIAGUA  Coronary artery disease  CKD  Hypertension  Constipation  DVT prophylaxis with SCDs      Resume torsemide 20 mg bid; monitor bp  Nonsurgical treatment. Recommended WBAT with walker per ortho consult. PT/OT. Rec repeat xrays in 10-14 days  Telemetry  analgesics prn, decreased to 1 tablet  replace K prn  Antiemetics prn  Consult cardiology appreciated  Continue PHTN meds  Hold torsemide for now due to hypotension; can likely resume tomorrow. BP improved.  Cr at baseline   Home meds per clinical course  monitor labs   miralax daily to avoid constipation while on narcotics  PT/OT rec mod intensity     Discharge Medications   New acetaminophen-oxycodone (acetaminophen-oxycodone 325 mg-5 mg oral tablet = Percocet)1 Tabs Oral EVERY FOUR HOURS as needed Moderate Pain for 7 Days. Refills: 0. docusate (Colace 100 mg oral capsule)1 Capsules Oral DAILY. insulin regular (NovoLIN R 100 units/mL injectable solution)Mild Scale Subcutaneous WITH MEALS AND AT BEDTIME. << Sliding Scale Comments >>  0 Units; 151-200 2 Units; 201-250 4 Units; 251-300 6 Units; 301-350 8 Units; 351-400 10 Units; Greater than 400 12 Units << Sliding Scale Comments >>. pantoprazole (Protonix 40 mg oral delayed release tablet)1 Tabs Oral DAILY.   Changed polyethylene glycol 3350 (MiraLax oral powder for reconstitution)17 Gram Oral DAILY for 31 Days. dissolve in 4 to 8 oz of beverage. Refills:  3.   Unchanged calcitriol (Rocaltrol 0.25 mcg oral capsule)1 Capsules Oral DAILY. ferrous sulfate ( iron ) (Slow Release Iron=Slow Fe 140 mg (45 mg elemental iron) oral tablet, extended release)1 Tabs Oral MON,WED,FRI. Refills: 3. finerenone (Kerendia 20 mg oral tablet)1 Tabs Oral DAILY. montelukast (Singulair 10 mg oral tablet)1 Tabs Oral EVERY EVENING. Refills: 3. multivitamin with minerals (PreserVision AREDS 2 oral capsule)1 Capsules Oral TWICE A DAY. potassium chloride (KCL) (Potassium Chloride (Eqv-Klor-Con M10) 10 mEq oral tablet, extended release)1 Tabs Oral DAILY. Prescription DME (accu check fredi plus strips)test once a day. Refills: 3. Prescription DME (accu echek softclix lancets)test once a day. Refills: 3. Prescription DME (HANDICAP PLACARD)DURATION OF 5 YEARS. Refills: 0. sildenafil = Revatio (sildenafil 20 mg oral tablet)2 Tabs Oral THREE TIMES A DAY. torsemide = Demadex (torsemide 20 mg oral tablet)1 Tabs Oral TWICE A DAY.   Discontinued glimepiride (glimepiride 1 mg oral tablet)1 Tabs Oral DAILY for 90 Days. replaces glipizide. Refills: 3. traMADol (traMADol 50 mg oral tablet)take ONE-HALF TABLET BY MOUTH EVERY 8 HOURS FOR 6 DAYS AS NEEDED FOR ARTHRITIS pain. Refills: 0. Hospital Course     79-year-old female with history of pulmonary hypertension, chronic thrombocytopenia, liver cirrhosis secondary to PANIAGUA with portal hypertension, coronary disease, chronic kidney disease and diabetes was admitted after mechanical fall at home sustaining left inferior pubic rami and sacral and acetabulum fractures. She was hypoxic after morphine in the ER requiring nasal cannula. She had an episode of vasovagal syncope on the floor after Dulcolax suppository was placed. Diuretics were held for a few days initialing of her hospitalization. Torsemide was eventually resumed. She was seen by orthopedic surgeon and allowed weightbearing as tolerated with walker. Therapy recommended skilled nursing facility and she was  discharged there.    I discussed advanced care planning for more than 16 minutes including the explanation and discussion of advanced directives. Information and advise was also provided on DO NOT RESUSCITATE and patient encouraged to consider this  Patient is not sure about DNR at this time.      Source of history: Nurse, Medical personnel, Medical record, Patient.  History limitation: None.    HPI:  History and physical    Patient is unable to give any detailed history and therefore history is obtained from the chart  No acute complaints voiced by the patient or acute concerns raised by nursing    History of hospitalization-    Chief Complaint hip pain, chest tightness History of Present Illness 79 year old female was trying to put her walker away after physical therapy when it got away from her and in reaching for it she fell, landing onto her left hip. She had immediate pain and could not get up. She denied head injury, neck pain or loss of consciousness. She received morphine for pain and was complaining of chest tightness, dizziness, nausea and thirstiness when I saw her. Sats were in the upper 80's and improved to 90's on 2 liters nasal cannula and chest tightness had improved. She has chronic thrombocytopenia history and pulmonary htn and follows with Dr. Peace.     Problem List/Past Medical History Ongoing Acute-on-chronic kidney injury Adverse reaction to pneumococcal vaccine Anemia Arthritis Bruising tendency Cataract associated with type 2 diabetes mellitus Cataracts Cervical fusion syndrome Cervical spine arthritis CHF (congestive heart failure) Chronic constipation Chronic kidney disease Chronic kidney disease stage 4 Chronic sinusitis CKD (chronic kidney disease), stage IV Coronary artery disease Degenerative arthritis of thumb DISH (diffuse idiopathic skeletal hyperostosis) Eosinophilic asthma Fall at home Gastroesophageal reflux disease Hearing loss High cholesterol History of adenomatous polyp  of colon History of COVID-19 Impacted cerumen of right ear Intermittent asthma Leukopenia Liver cirrhosis secondary to PANIAGUA, advanced Loss of appetite Low income Lumbar and sacral spondyloarthritis Lumbar spinal stenosis Lumbar spondylosis Nocturnal hypoxemia Nonobstructive atherosclerosis of coronary artery Onychomycosis due to dermatophyte Osteoporosis Otalgia Pain of toes of bilateral feet Pancreatic lesion Parathyroid disease Pneumonia due to COVID-19 virus Polyneuropathy due to type 2 diabetes mellitus Portal hypertension Pulmonary arterial hypertension Right heart failure Right rotator cuff tendinitis 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[employer] Highest education:Some college   Exercise - Occasional exercise Times per week:1-2 times/week Exercise type:chair exercise class   Home/Environment Lives with:Alone *Living Situation Prior to AdmissionHome/Independent Home equipment:None, Walker/Cane Monitoring Equipment in homeGlucose monitoring *Special  Services and Community Resources Prior to AdmissionNone *Mobility Assistance Prior to AdmissionIndependent Home BarriersNone *Will patient require additional/new services upon discharge?No   Nutrition/Health Type of diet:Regular AppetiteFair Feeding AssistanceIndependent   Other Name:Daily Caffeine Details:Consumes on average 1 cup of regular coffee per day - alternating with tea Consumes on average 1 cup of hot tea per day - alternating with coffee Consumes on average 32oz of iced tea per day - alternating with soda Consumes on average 32oz of soda per   Substance Abuse - Denies Substance Abuse   Tobacco - Denies Tobacco Use Use:Never (less than 100 in lifetime) Family History Cardiac disease.: Mother. Gastric cancer: Brother. Heart attack.: Brother. Heart disease: Mother.Negative: Father. Heart failure.: Mother. Stroke.: Father. Health Status Family Member(s)  Family Member(s) Relationship: Father, Age: Unknown, Cause: Brain Hemorrhage Relationship: Mother, Age: Unknown   Current Outpatient Medications   Medication Sig Dispense Refill   • Accu-Chek Bozena Plus test strp strip      • Accu-Chek Softclix Lancets misc      • atenolol (Tenormin) 25 mg tablet  (Patient not taking: Reported on 2025)     • Constulose 10 gram/15 mL oral solution take 15 mL ORAL TWICE DAILY x30 days as needed for constipation (Patient not taking: Reported on 2025)     • Farxiga 10 mg 1 tablet (10 mg). (Patient not taking: Reported on 2025)     • finerenone (Kerendia) 10 mg tablet tablet Take 1 tablet (10 mg) by mouth once daily.     • furosemide (Lasix) 40 mg tablet Take 1 tablet (40 mg) by mouth once daily. (Patient not taking: Reported on 2025)     • glipiZIDE (Glucotrol) 5 mg tablet 1 tablet (5 mg).     • montelukast (Singulair) 10 mg tablet      • omeprazole (PriLOSEC) 20 mg DR capsule Take 1 capsule (20 mg) by mouth once daily in the morning. Take before meals. Do not crush or chew. 90 capsule 3   •  polyethylene glycol (Glycolax, Miralax) 17 gram/dose powder Take 17g ORAL DAILY for 30 days,Instrdissolve in water before taking.     • sildenafil (Revatio) 20 mg tablet Take 2 tablets (40 mg) by mouth 3 times a day.     • spironolactone (Aldactone) 50 mg tablet  (Patient not taking: Reported on 1/28/2025)     • torsemide (Demadex) 20 mg tablet        No current facility-administered medications for this visit.       Physical Exam:  Vital signs as per nursing/MA documentation were reviewed  General appearance: Alert and in no acute distress  HEENT: Normal Inspection  Neck - Normal Inspection  Respiratory : No respiratory distress. Lungs are clear   Cardiovascular: heart rate normal. No gallop  Back - normal inspection  Skin inspection:Warm  Musculoskeletal : No deformities  Neuro : Limited exam. Baseline    ROS: Review of symptoms is negative except for what is mentioned in HPI    Results/Data  Records including but not limited to electronic medical records, relevant lab work and imaging from patient's health care facility encounter were reviewed and independently verified      Charting was completed using voice recognition technology and may include unintended errors.    Discussed with patient/family, RN, and NP.      Electronically Signed By: Noe Mccartney MD   4/3/25  5:32 PM

## 2025-04-03 NOTE — PROGRESS NOTES
Kera Gonsales   79 y.o.  female  @location@            Assessment and Plan:  History and physical    Mechanical fall with left inferior pubic rami and sacral and acetabulum fractures (Multiple pelvic fractures)  Vasovagal syncope after dulcolax suppository placed  Chest pain likely due to hypoxemia from IV narcotics  Hypotension due to narcotics  Hypokalemia  NIDDM  Chronic thrombocytopenia  Pulmonary HTN  Right heart failure  Portal hypertension  Liver cirrhosis secondary to PANIAGUA  Coronary artery disease  CKD  Hypertension  Constipation  DVT prophylaxis with SCDs      Resume torsemide 20 mg bid; monitor bp  Nonsurgical treatment. Recommended WBAT with walker per ortho consult. PT/OT. Rec repeat xrays in 10-14 days  Telemetry  analgesics prn, decreased to 1 tablet  replace K prn  Antiemetics prn  Consult cardiology appreciated  Continue PHTN meds  Hold torsemide for now due to hypotension; can likely resume tomorrow. BP improved.  Cr at baseline   Home meds per clinical course  monitor labs   miralax daily to avoid constipation while on narcotics  PT/OT rec mod intensity     Discharge Medications   New acetaminophen-oxycodone (acetaminophen-oxycodone 325 mg-5 mg oral tablet = Percocet)1 Tabs Oral EVERY FOUR HOURS as needed Moderate Pain for 7 Days. Refills: 0. docusate (Colace 100 mg oral capsule)1 Capsules Oral DAILY. insulin regular (NovoLIN R 100 units/mL injectable solution)Mild Scale Subcutaneous WITH MEALS AND AT BEDTIME. << Sliding Scale Comments >>  0 Units; 151-200 2 Units; 201-250 4 Units; 251-300 6 Units; 301-350 8 Units; 351-400 10 Units; Greater than 400 12 Units << Sliding Scale Comments >>. pantoprazole (Protonix 40 mg oral delayed release tablet)1 Tabs Oral DAILY.   Changed polyethylene glycol 3350 (MiraLax oral powder for reconstitution)17 Gram Oral DAILY for 31 Days. dissolve in 4 to 8 oz of beverage. Refills: 3.   Unchanged calcitriol (Rocaltrol 0.25 mcg oral capsule)1 Capsules Oral  DAILY. ferrous sulfate ( iron ) (Slow Release Iron=Slow Fe 140 mg (45 mg elemental iron) oral tablet, extended release)1 Tabs Oral MON,WED,FRI. Refills: 3. finerenone (Kerendia 20 mg oral tablet)1 Tabs Oral DAILY. montelukast (Singulair 10 mg oral tablet)1 Tabs Oral EVERY EVENING. Refills: 3. multivitamin with minerals (PreserVision AREDS 2 oral capsule)1 Capsules Oral TWICE A DAY. potassium chloride (KCL) (Potassium Chloride (Eqv-Klor-Con M10) 10 mEq oral tablet, extended release)1 Tabs Oral DAILY. Prescription DME (accu check fredi plus strips)test once a day. Refills: 3. Prescription DME (accu echek softclix lancets)test once a day. Refills: 3. Prescription DME (HANDICAP PLACARD)DURATION OF 5 YEARS. Refills: 0. sildenafil = Revatio (sildenafil 20 mg oral tablet)2 Tabs Oral THREE TIMES A DAY. torsemide = Demadex (torsemide 20 mg oral tablet)1 Tabs Oral TWICE A DAY.   Discontinued glimepiride (glimepiride 1 mg oral tablet)1 Tabs Oral DAILY for 90 Days. replaces glipizide. Refills: 3. traMADol (traMADol 50 mg oral tablet)take ONE-HALF TABLET BY MOUTH EVERY 8 HOURS FOR 6 DAYS AS NEEDED FOR ARTHRITIS pain. Refills: 0. Hospital Course     79-year-old female with history of pulmonary hypertension, chronic thrombocytopenia, liver cirrhosis secondary to PANIAGUA with portal hypertension, coronary disease, chronic kidney disease and diabetes was admitted after mechanical fall at home sustaining left inferior pubic rami and sacral and acetabulum fractures. She was hypoxic after morphine in the ER requiring nasal cannula. She had an episode of vasovagal syncope on the floor after Dulcolax suppository was placed. Diuretics were held for a few days initialing of her hospitalization. Torsemide was eventually resumed. She was seen by orthopedic surgeon and allowed weightbearing as tolerated with walker. Therapy recommended skilled nursing facility and she was discharged there.    I discussed advanced care planning for more than 16  minutes including the explanation and discussion of advanced directives. Information and advise was also provided on DO NOT RESUSCITATE and patient encouraged to consider this  Patient is not sure about DNR at this time.      Source of history: Nurse, Medical personnel, Medical record, Patient.  History limitation: None.    HPI:  History and physical    Patient is unable to give any detailed history and therefore history is obtained from the chart  No acute complaints voiced by the patient or acute concerns raised by nursing    History of hospitalization-    Chief Complaint hip pain, chest tightness History of Present Illness 79 year old female was trying to put her walker away after physical therapy when it got away from her and in reaching for it she fell, landing onto her left hip. She had immediate pain and could not get up. She denied head injury, neck pain or loss of consciousness. She received morphine for pain and was complaining of chest tightness, dizziness, nausea and thirstiness when I saw her. Sats were in the upper 80's and improved to 90's on 2 liters nasal cannula and chest tightness had improved. She has chronic thrombocytopenia history and pulmonary htn and follows with Dr. Peace.     Problem List/Past Medical History Ongoing Acute-on-chronic kidney injury Adverse reaction to pneumococcal vaccine Anemia Arthritis Bruising tendency Cataract associated with type 2 diabetes mellitus Cataracts Cervical fusion syndrome Cervical spine arthritis CHF (congestive heart failure) Chronic constipation Chronic kidney disease Chronic kidney disease stage 4 Chronic sinusitis CKD (chronic kidney disease), stage IV Coronary artery disease Degenerative arthritis of thumb DISH (diffuse idiopathic skeletal hyperostosis) Eosinophilic asthma Fall at home Gastroesophageal reflux disease Hearing loss High cholesterol History of adenomatous polyp of colon History of COVID-19 Impacted cerumen of right ear Intermittent  asthma Leukopenia Liver cirrhosis secondary to PANIAGUA, advanced Loss of appetite Low income Lumbar and sacral spondyloarthritis Lumbar spinal stenosis Lumbar spondylosis Nocturnal hypoxemia Nonobstructive atherosclerosis of coronary artery Onychomycosis due to dermatophyte Osteoporosis Otalgia Pain of toes of bilateral feet Pancreatic lesion Parathyroid disease Pneumonia due to COVID-19 virus Polyneuropathy due to type 2 diabetes mellitus Portal hypertension Pulmonary arterial hypertension Right heart failure Right rotator cuff tendinitis 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   Exercise - Occasional exercise Times per week:1-2 times/week Exercise type:chair exercise class   Home/Environment Lives with:Alone *Living Situation Prior to AdmissionHome/Independent Home equipment:None, Walker/Cane Monitoring Equipment in homeGlucose monitoring *Special Services and Community Resources Prior to AdmissionNone *Mobility Assistance  Prior to AdmissionIndependent Home BarriersNone *Will patient require additional/new services upon discharge?No   Nutrition/Health Type of diet:Regular AppetiteFair Feeding AssistanceIndependent   Other Name:Daily Caffeine Details:Consumes on average 1 cup of regular coffee per day - alternating with tea Consumes on average 1 cup of hot tea per day - alternating with coffee Consumes on average 32oz of iced tea per day - alternating with soda Consumes on average 32oz of soda per   Substance Abuse - Denies Substance Abuse   Tobacco - Denies Tobacco Use Use:Never (less than 100 in lifetime) Family History Cardiac disease.: Mother. Gastric cancer: Brother. Heart attack.: Brother. Heart disease: Mother.Negative: Father. Heart failure.: Mother. Stroke.: Father. Health Status Family Member(s)  Family Member(s) Relationship: Father, Age: Unknown, Cause: Brain Hemorrhage Relationship: Mother, Age: Unknown   Current Outpatient Medications   Medication Sig Dispense Refill    Accu-Chek Bozena Plus test strp strip       Accu-Chek Softclix Lancets misc       atenolol (Tenormin) 25 mg tablet  (Patient not taking: Reported on 2025)      Constulose 10 gram/15 mL oral solution take 15 mL ORAL TWICE DAILY x30 days as needed for constipation (Patient not taking: Reported on 2025)      Farxiga 10 mg 1 tablet (10 mg). (Patient not taking: Reported on 2025)      finerenone (Kerendia) 10 mg tablet tablet Take 1 tablet (10 mg) by mouth once daily.      furosemide (Lasix) 40 mg tablet Take 1 tablet (40 mg) by mouth once daily. (Patient not taking: Reported on 2025)      glipiZIDE (Glucotrol) 5 mg tablet 1 tablet (5 mg).      montelukast (Singulair) 10 mg tablet       omeprazole (PriLOSEC) 20 mg DR capsule Take 1 capsule (20 mg) by mouth once daily in the morning. Take before meals. Do not crush or chew. 90 capsule 3    polyethylene glycol (Glycolax, Miralax) 17 gram/dose powder Take 17g ORAL DAILY for 30  days,Instrdissolve in water before taking.      sildenafil (Revatio) 20 mg tablet Take 2 tablets (40 mg) by mouth 3 times a day.      spironolactone (Aldactone) 50 mg tablet  (Patient not taking: Reported on 1/28/2025)      torsemide (Demadex) 20 mg tablet        No current facility-administered medications for this visit.       Physical Exam:  Vital signs as per nursing/MA documentation were reviewed  General appearance: Alert and in no acute distress  HEENT: Normal Inspection  Neck - Normal Inspection  Respiratory : No respiratory distress. Lungs are clear   Cardiovascular: heart rate normal. No gallop  Back - normal inspection  Skin inspection:Warm  Musculoskeletal : No deformities  Neuro : Limited exam. Baseline    ROS: Review of symptoms is negative except for what is mentioned in HPI    Results/Data  Records including but not limited to electronic medical records, relevant lab work and imaging from patient's health care facility encounter were reviewed and independently verified      Charting was completed using voice recognition technology and may include unintended errors.    Discussed with patient/family, RN, and NP.   no

## 2025-05-13 ENCOUNTER — NURSING HOME VISIT (OUTPATIENT)
Dept: POST ACUTE CARE | Facility: EXTERNAL LOCATION | Age: 80
End: 2025-05-13
Payer: MEDICARE

## 2025-05-13 DIAGNOSIS — I77.9: Primary | ICD-10-CM

## 2025-05-13 DIAGNOSIS — E07.0 HYPERSECRETION OF CALCITONIN: ICD-10-CM

## 2025-05-13 DIAGNOSIS — K75.81 NONALCOHOLIC STEATOHEPATITIS (NASH): ICD-10-CM

## 2025-05-13 DIAGNOSIS — Z20.822 SUSPECTED SEVERE ACUTE RESPIRATORY SYNDROME CORONAVIRUS 2 (SARS-COV-2) INFECTION: ICD-10-CM

## 2025-05-13 DIAGNOSIS — D69.6 LOW PLATELET COUNT: ICD-10-CM

## 2025-05-13 DIAGNOSIS — K76.6 PORTAL HYPERTENSION (MULTI): ICD-10-CM

## 2025-05-13 DIAGNOSIS — I82.210 ACUTE EMBOLISM AND THROMBOSIS OF SUPERIOR VENA CAVA (MULTI): ICD-10-CM

## 2025-05-13 PROCEDURE — 99309 SBSQ NF CARE MODERATE MDM 30: CPT | Performed by: EMERGENCY MEDICINE

## 2025-05-13 NOTE — LETTER
Patient: Kera Gonsales  : 1945    Encounter Date: 2025    Kera Gonsales   80 y.o.  female  @location@            Assessment and Plan:      Mechanical fall with left inferior pubic rami and sacral and acetabulum fractures (Multiple pelvic fractures)  Vasovagal syncope after dulcolax suppository placed  Chest pain likely due to hypoxemia from IV narcotics  Hypotension due to narcotics  Hypokalemia  NIDDM  Chronic thrombocytopenia  Pulmonary HTN  Right heart failure  Portal hypertension  Liver cirrhosis secondary to PANIAGUA  Coronary artery disease  CKD  Hypertension  Constipation  DVT prophylaxis with SCDs      Resume torsemide 20 mg bid; monitor bp  Nonsurgical treatment. Recommended WBAT with walker per ortho consult. PT/OT. Rec repeat xrays in 10-14 days  Telemetry  analgesics prn, decreased to 1 tablet  replace K prn  Antiemetics prn  Consult cardiology appreciated  Continue PHTN meds  Hold torsemide for now due to hypotension; can likely resume tomorrow. BP improved.  Cr at baseline   Home meds per clinical course  monitor labs   miralax daily to avoid constipation while on narcotics  PT/OT rec mod intensity     Discharge Medications   New acetaminophen-oxycodone (acetaminophen-oxycodone 325 mg-5 mg oral tablet = Percocet)1 Tabs Oral EVERY FOUR HOURS as needed Moderate Pain for 7 Days. Refills: 0. docusate (Colace 100 mg oral capsule)1 Capsules Oral DAILY. insulin regular (NovoLIN R 100 units/mL injectable solution)Mild Scale Subcutaneous WITH MEALS AND AT BEDTIME. << Sliding Scale Comments >>  0 Units; 151-200 2 Units; 201-250 4 Units; 251-300 6 Units; 301-350 8 Units; 351-400 10 Units; Greater than 400 12 Units << Sliding Scale Comments >>. pantoprazole (Protonix 40 mg oral delayed release tablet)1 Tabs Oral DAILY.   Changed polyethylene glycol 3350 (MiraLax oral powder for reconstitution)17 Gram Oral DAILY for 31 Days. dissolve in 4 to 8 oz of beverage. Refills: 3.   Unchanged  calcitriol (Rocaltrol 0.25 mcg oral capsule)1 Capsules Oral DAILY. ferrous sulfate ( iron ) (Slow Release Iron=Slow Fe 140 mg (45 mg elemental iron) oral tablet, extended release)1 Tabs Oral MON,WED,FRI. Refills: 3. finerenone (Kerendia 20 mg oral tablet)1 Tabs Oral DAILY. montelukast (Singulair 10 mg oral tablet)1 Tabs Oral EVERY EVENING. Refills: 3. multivitamin with minerals (PreserVision AREDS 2 oral capsule)1 Capsules Oral TWICE A DAY. potassium chloride (KCL) (Potassium Chloride (Eqv-Klor-Con M10) 10 mEq oral tablet, extended release)1 Tabs Oral DAILY. Prescription DME (accu check fredi plus strips)test once a day. Refills: 3. Prescription DME (accu echek softclix lancets)test once a day. Refills: 3. Prescription DME (HANDICAP PLACARD)DURATION OF 5 YEARS. Refills: 0. sildenafil = Revatio (sildenafil 20 mg oral tablet)2 Tabs Oral THREE TIMES A DAY. torsemide = Demadex (torsemide 20 mg oral tablet)1 Tabs Oral TWICE A DAY.   Discontinued glimepiride (glimepiride 1 mg oral tablet)1 Tabs Oral DAILY for 90 Days. replaces glipizide. Refills: 3. traMADol (traMADol 50 mg oral tablet)take ONE-HALF TABLET BY MOUTH EVERY 8 HOURS FOR 6 DAYS AS NEEDED FOR ARTHRITIS pain. Refills: 0. Hospital Course     79-year-old female with history of pulmonary hypertension, chronic thrombocytopenia, liver cirrhosis secondary to PANIAGUA with portal hypertension, coronary disease, chronic kidney disease and diabetes was admitted after mechanical fall at home sustaining left inferior pubic rami and sacral and acetabulum fractures. She was hypoxic after morphine in the ER requiring nasal cannula. She had an episode of vasovagal syncope on the floor after Dulcolax suppository was placed. Diuretics were held for a few days initialing of her hospitalization. Torsemide was eventually resumed. She was seen by orthopedic surgeon and allowed weightbearing as tolerated with walker. Therapy recommended skilled nursing facility and she was discharged  there.    -Fall prevention    -Cognitive engagement     -Monitor and treat blood pressure     -Aggressive decubitus ulcer prevention.     -Bowel and bladder care     -Optimal nutrition and supplementation as needed     -GI  and DVT prophylaxis     -Symptom control     -Ambulation as tolerated     -Will follow    Charting is done using voice recognition software and may contain errors which have not been completely corrected      Source of history: Nurse, Medical personnel, Medical record, Patient.  History limitation: None.    HPI:  History and physical    Patient is unable to give any detailed history and therefore history is obtained from the chart  No acute complaints voiced by the patient or acute concerns raised by nursing    History of hospitalization-    Chief Complaint hip pain, chest tightness History of Present Illness 79 year old female was trying to put her walker away after physical therapy when it got away from her and in reaching for it she fell, landing onto her left hip. She had immediate pain and could not get up. She denied head injury, neck pain or loss of consciousness. She received morphine for pain and was complaining of chest tightness, dizziness, nausea and thirstiness when I saw her. Sats were in the upper 80's and improved to 90's on 2 liters nasal cannula and chest tightness had improved. She has chronic thrombocytopenia history and pulmonary htn and follows with Dr. Peace.     Problem List/Past Medical History Ongoing Acute-on-chronic kidney injury Adverse reaction to pneumococcal vaccine Anemia Arthritis Bruising tendency Cataract associated with type 2 diabetes mellitus Cataracts Cervical fusion syndrome Cervical spine arthritis CHF (congestive heart failure) Chronic constipation Chronic kidney disease Chronic kidney disease stage 4 Chronic sinusitis CKD (chronic kidney disease), stage IV Coronary artery disease Degenerative arthritis of thumb DISH (diffuse idiopathic skeletal  hyperostosis) Eosinophilic asthma Fall at home Gastroesophageal reflux disease Hearing loss High cholesterol History of adenomatous polyp of colon History of COVID-19 Impacted cerumen of right ear Intermittent asthma Leukopenia Liver cirrhosis secondary to PANIAGUA, advanced Loss of appetite Low income Lumbar and sacral spondyloarthritis Lumbar spinal stenosis Lumbar spondylosis Nocturnal hypoxemia Nonobstructive atherosclerosis of coronary artery Onychomycosis due to dermatophyte Osteoporosis Otalgia Pain of toes of bilateral feet Pancreatic lesion Parathyroid disease Pneumonia due to COVID-19 virus Polyneuropathy due to type 2 diabetes mellitus Portal hypertension Pulmonary arterial hypertension Right heart failure Right rotator cuff tendinitis 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[employer] Highest education:Some college   Exercise - Occasional exercise Times per week:1-2 times/week Exercise type:chair exercise class   Home/Environment Lives with:Alone *Living  Situation Prior to AdmissionHome/Independent Home equipment:None, Walker/Cane Monitoring Equipment in homeGlucose monitoring *Special Services and Community Resources Prior to AdmissionNone *Mobility Assistance Prior to AdmissionIndependent Home BarriersNone *Will patient require additional/new services upon discharge?No   Nutrition/Health Type of diet:Regular AppetiteFair Feeding AssistanceIndependent   Other Name:Daily Caffeine Details:Consumes on average 1 cup of regular coffee per day - alternating with tea Consumes on average 1 cup of hot tea per day - alternating with coffee Consumes on average 32oz of iced tea per day - alternating with soda Consumes on average 32oz of soda per   Substance Abuse - Denies Substance Abuse   Tobacco - Denies Tobacco Use Use:Never (less than 100 in lifetime) Family History Cardiac disease.: Mother. Gastric cancer: Brother. Heart attack.: Brother. Heart disease: Mother.Negative: Father. Heart failure.: Mother. Stroke.: Father. Health Status Family Member(s)  Family Member(s) Relationship: Father, Age: Unknown, Cause: Brain Hemorrhage Relationship: Mother, Age: Unknown   Current Outpatient Medications   Medication Sig Dispense Refill   • Accu-Chek Bozena Plus test strp strip      • Accu-Chek Softclix Lancets misc      • atenolol (Tenormin) 25 mg tablet  (Patient not taking: Reported on 2025)     • Constulose 10 gram/15 mL oral solution take 15 mL ORAL TWICE DAILY x30 days as needed for constipation (Patient not taking: Reported on 2025)     • Farxiga 10 mg 1 tablet (10 mg). (Patient not taking: Reported on 2025)     • finerenone (Kerendia) 10 mg tablet tablet Take 1 tablet (10 mg) by mouth once daily.     • furosemide (Lasix) 40 mg tablet Take 1 tablet (40 mg) by mouth once daily. (Patient not taking: Reported on 2025)     • glipiZIDE (Glucotrol) 5 mg tablet 1 tablet (5 mg).     • montelukast (Singulair) 10 mg tablet      • omeprazole (PriLOSEC) 20 mg   capsule Take 1 capsule (20 mg) by mouth once daily in the morning. Take before meals. Do not crush or chew. 90 capsule 3   • polyethylene glycol (Glycolax, Miralax) 17 gram/dose powder Take 17g ORAL DAILY for 30 days,Instrdissolve in water before taking.     • sildenafil (Revatio) 20 mg tablet Take 2 tablets (40 mg) by mouth 3 times a day.     • spironolactone (Aldactone) 50 mg tablet  (Patient not taking: Reported on 1/28/2025)     • torsemide (Demadex) 20 mg tablet        No current facility-administered medications for this visit.       Physical Exam:  Vital signs as per nursing/MA documentation were reviewed  General appearance: Alert and in no acute distress  HEENT: Normal Inspection  Neck - Normal Inspection  Respiratory : No respiratory distress. Lungs are clear   Cardiovascular: heart rate normal. No gallop  Back - normal inspection  Skin inspection:Warm  Musculoskeletal : No deformities  Neuro : Limited exam. Baseline    ROS: Review of symptoms is negative except for what is mentioned in HPI    Results/Data  Records including but not limited to electronic medical records, relevant lab work and imaging from patient's health care facility encounter were reviewed and independently verified      Charting was completed using voice recognition technology and may include unintended errors.    Discussed with patient/family, RN, and NP.    Electronically Signed By: Noe Mccartney MD   5/13/25  5:16 PM

## 2025-05-13 NOTE — PROGRESS NOTES
Kera Gonsales   80 y.o.  female  @location@            Assessment and Plan:      Mechanical fall with left inferior pubic rami and sacral and acetabulum fractures (Multiple pelvic fractures)  Vasovagal syncope after dulcolax suppository placed  Chest pain likely due to hypoxemia from IV narcotics  Hypotension due to narcotics  Hypokalemia  NIDDM  Chronic thrombocytopenia  Pulmonary HTN  Right heart failure  Portal hypertension  Liver cirrhosis secondary to PANIAGUA  Coronary artery disease  CKD  Hypertension  Constipation  DVT prophylaxis with SCDs      Resume torsemide 20 mg bid; monitor bp  Nonsurgical treatment. Recommended WBAT with walker per ortho consult. PT/OT. Rec repeat xrays in 10-14 days  Telemetry  analgesics prn, decreased to 1 tablet  replace K prn  Antiemetics prn  Consult cardiology appreciated  Continue PHTN meds  Hold torsemide for now due to hypotension; can likely resume tomorrow. BP improved.  Cr at baseline   Home meds per clinical course  monitor labs   miralax daily to avoid constipation while on narcotics  PT/OT rec mod intensity     Discharge Medications   New acetaminophen-oxycodone (acetaminophen-oxycodone 325 mg-5 mg oral tablet = Percocet)1 Tabs Oral EVERY FOUR HOURS as needed Moderate Pain for 7 Days. Refills: 0. docusate (Colace 100 mg oral capsule)1 Capsules Oral DAILY. insulin regular (NovoLIN R 100 units/mL injectable solution)Mild Scale Subcutaneous WITH MEALS AND AT BEDTIME. << Sliding Scale Comments >>  0 Units; 151-200 2 Units; 201-250 4 Units; 251-300 6 Units; 301-350 8 Units; 351-400 10 Units; Greater than 400 12 Units << Sliding Scale Comments >>. pantoprazole (Protonix 40 mg oral delayed release tablet)1 Tabs Oral DAILY.   Changed polyethylene glycol 3350 (MiraLax oral powder for reconstitution)17 Gram Oral DAILY for 31 Days. dissolve in 4 to 8 oz of beverage. Refills: 3.   Unchanged calcitriol (Rocaltrol 0.25 mcg oral capsule)1 Capsules Oral DAILY. ferrous  sulfate ( iron ) (Slow Release Iron=Slow Fe 140 mg (45 mg elemental iron) oral tablet, extended release)1 Tabs Oral MON,WED,FRI. Refills: 3. finerenone (Kerendia 20 mg oral tablet)1 Tabs Oral DAILY. montelukast (Singulair 10 mg oral tablet)1 Tabs Oral EVERY EVENING. Refills: 3. multivitamin with minerals (PreserVision AREDS 2 oral capsule)1 Capsules Oral TWICE A DAY. potassium chloride (KCL) (Potassium Chloride (Eqv-Klor-Con M10) 10 mEq oral tablet, extended release)1 Tabs Oral DAILY. Prescription DME (accu check fredi plus strips)test once a day. Refills: 3. Prescription DME (accu echek softclix lancets)test once a day. Refills: 3. Prescription DME (HANDICAP PLACARD)DURATION OF 5 YEARS. Refills: 0. sildenafil = Revatio (sildenafil 20 mg oral tablet)2 Tabs Oral THREE TIMES A DAY. torsemide = Demadex (torsemide 20 mg oral tablet)1 Tabs Oral TWICE A DAY.   Discontinued glimepiride (glimepiride 1 mg oral tablet)1 Tabs Oral DAILY for 90 Days. replaces glipizide. Refills: 3. traMADol (traMADol 50 mg oral tablet)take ONE-HALF TABLET BY MOUTH EVERY 8 HOURS FOR 6 DAYS AS NEEDED FOR ARTHRITIS pain. Refills: 0. Hospital Course     79-year-old female with history of pulmonary hypertension, chronic thrombocytopenia, liver cirrhosis secondary to PANIAGUA with portal hypertension, coronary disease, chronic kidney disease and diabetes was admitted after mechanical fall at home sustaining left inferior pubic rami and sacral and acetabulum fractures. She was hypoxic after morphine in the ER requiring nasal cannula. She had an episode of vasovagal syncope on the floor after Dulcolax suppository was placed. Diuretics were held for a few days initialing of her hospitalization. Torsemide was eventually resumed. She was seen by orthopedic surgeon and allowed weightbearing as tolerated with walker. Therapy recommended skilled nursing facility and she was discharged there.    -Fall prevention    -Cognitive engagement     -Monitor and treat  blood pressure     -Aggressive decubitus ulcer prevention.     -Bowel and bladder care     -Optimal nutrition and supplementation as needed     -GI  and DVT prophylaxis     -Symptom control     -Ambulation as tolerated     -Will follow    Charting is done using voice recognition software and may contain errors which have not been completely corrected      Source of history: Nurse, Medical personnel, Medical record, Patient.  History limitation: None.    HPI:  History and physical    Patient is unable to give any detailed history and therefore history is obtained from the chart  No acute complaints voiced by the patient or acute concerns raised by nursing    History of hospitalization-    Chief Complaint hip pain, chest tightness History of Present Illness 79 year old female was trying to put her walker away after physical therapy when it got away from her and in reaching for it she fell, landing onto her left hip. She had immediate pain and could not get up. She denied head injury, neck pain or loss of consciousness. She received morphine for pain and was complaining of chest tightness, dizziness, nausea and thirstiness when I saw her. Sats were in the upper 80's and improved to 90's on 2 liters nasal cannula and chest tightness had improved. She has chronic thrombocytopenia history and pulmonary htn and follows with Dr. Peace.     Problem List/Past Medical History Ongoing Acute-on-chronic kidney injury Adverse reaction to pneumococcal vaccine Anemia Arthritis Bruising tendency Cataract associated with type 2 diabetes mellitus Cataracts Cervical fusion syndrome Cervical spine arthritis CHF (congestive heart failure) Chronic constipation Chronic kidney disease Chronic kidney disease stage 4 Chronic sinusitis CKD (chronic kidney disease), stage IV Coronary artery disease Degenerative arthritis of thumb DISH (diffuse idiopathic skeletal hyperostosis) Eosinophilic asthma Fall at home Gastroesophageal reflux disease  Hearing loss High cholesterol History of adenomatous polyp of colon History of COVID-19 Impacted cerumen of right ear Intermittent asthma Leukopenia Liver cirrhosis secondary to PANIAGUA, advanced Loss of appetite Low income Lumbar and sacral spondyloarthritis Lumbar spinal stenosis Lumbar spondylosis Nocturnal hypoxemia Nonobstructive atherosclerosis of coronary artery Onychomycosis due to dermatophyte Osteoporosis Otalgia Pain of toes of bilateral feet Pancreatic lesion Parathyroid disease Pneumonia due to COVID-19 virus Polyneuropathy due to type 2 diabetes mellitus Portal hypertension Pulmonary arterial hypertension Right heart failure Right rotator cuff tendinitis 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   Exercise - Occasional exercise Times per week:1-2 times/week Exercise type:chair exercise class   Home/Environment Lives with:Alone *Living Situation Prior to AdmissionHome/Independent Home equipment:None, Walker/Cane  Monitoring Equipment in homeGlucose monitoring *Special Services and Community Resources Prior to AdmissionNone *Mobility Assistance Prior to AdmissionIndependent Home BarriersNone *Will patient require additional/new services upon discharge?No   Nutrition/Health Type of diet:Regular AppetiteFair Feeding AssistanceIndependent   Other Name:Daily Caffeine Details:Consumes on average 1 cup of regular coffee per day - alternating with tea Consumes on average 1 cup of hot tea per day - alternating with coffee Consumes on average 32oz of iced tea per day - alternating with soda Consumes on average 32oz of soda per   Substance Abuse - Denies Substance Abuse   Tobacco - Denies Tobacco Use Use:Never (less than 100 in lifetime) Family History Cardiac disease.: Mother. Gastric cancer: Brother. Heart attack.: Brother. Heart disease: Mother.Negative: Father. Heart failure.: Mother. Stroke.: Father. Health Status Family Member(s)  Family Member(s) Relationship: Father, Age: Unknown, Cause: Brain Hemorrhage Relationship: Mother, Age: Unknown   Current Outpatient Medications   Medication Sig Dispense Refill    Accu-Chek Bozena Plus test strp strip       Accu-Chek Softclix Lancets misc       atenolol (Tenormin) 25 mg tablet  (Patient not taking: Reported on 2025)      Constulose 10 gram/15 mL oral solution take 15 mL ORAL TWICE DAILY x30 days as needed for constipation (Patient not taking: Reported on 2025)      Farxiga 10 mg 1 tablet (10 mg). (Patient not taking: Reported on 2025)      finerenone (Kerendia) 10 mg tablet tablet Take 1 tablet (10 mg) by mouth once daily.      furosemide (Lasix) 40 mg tablet Take 1 tablet (40 mg) by mouth once daily. (Patient not taking: Reported on 2025)      glipiZIDE (Glucotrol) 5 mg tablet 1 tablet (5 mg).      montelukast (Singulair) 10 mg tablet       omeprazole (PriLOSEC) 20 mg DR capsule Take 1 capsule (20 mg) by mouth once daily in the morning. Take before meals. Do  not crush or chew. 90 capsule 3    polyethylene glycol (Glycolax, Miralax) 17 gram/dose powder Take 17g ORAL DAILY for 30 days,Instrdissolve in water before taking.      sildenafil (Revatio) 20 mg tablet Take 2 tablets (40 mg) by mouth 3 times a day.      spironolactone (Aldactone) 50 mg tablet  (Patient not taking: Reported on 1/28/2025)      torsemide (Demadex) 20 mg tablet        No current facility-administered medications for this visit.       Physical Exam:  Vital signs as per nursing/MA documentation were reviewed  General appearance: Alert and in no acute distress  HEENT: Normal Inspection  Neck - Normal Inspection  Respiratory : No respiratory distress. Lungs are clear   Cardiovascular: heart rate normal. No gallop  Back - normal inspection  Skin inspection:Warm  Musculoskeletal : No deformities  Neuro : Limited exam. Baseline    ROS: Review of symptoms is negative except for what is mentioned in HPI    Results/Data  Records including but not limited to electronic medical records, relevant lab work and imaging from patient's health care facility encounter were reviewed and independently verified      Charting was completed using voice recognition technology and may include unintended errors.    Discussed with patient/family, RN, and NP.

## 2025-06-12 ENCOUNTER — NURSING HOME VISIT (OUTPATIENT)
Dept: POST ACUTE CARE | Facility: EXTERNAL LOCATION | Age: 80
End: 2025-06-12
Payer: MEDICARE

## 2025-06-12 DIAGNOSIS — K76.6 PORTAL HYPERTENSION (MULTI): Primary | ICD-10-CM

## 2025-06-12 DIAGNOSIS — I10 BENIGN ESSENTIAL HYPERTENSION: ICD-10-CM

## 2025-06-12 DIAGNOSIS — E11.9 CONTROLLED TYPE 2 DIABETES MELLITUS WITHOUT COMPLICATION, WITHOUT LONG-TERM CURRENT USE OF INSULIN: ICD-10-CM

## 2025-06-12 DIAGNOSIS — N18.4 CKD (CHRONIC KIDNEY DISEASE), STAGE IV (MULTI): ICD-10-CM

## 2025-06-12 PROCEDURE — 99308 SBSQ NF CARE LOW MDM 20: CPT | Performed by: EMERGENCY MEDICINE

## 2025-06-12 NOTE — LETTER
Patient: Kera Gonsales  : 1945    Encounter Date: 2025    Kera Gonsales   80 y.o.  female  @location@            Assessment and Plan:      Mechanical fall with left inferior pubic rami and sacral and acetabulum fractures (Multiple pelvic fractures)  Vasovagal syncope after dulcolax suppository placed  Chest pain likely due to hypoxemia from IV narcotics  Hypotension due to narcotics  Hypokalemia  NIDDM  Chronic thrombocytopenia  Pulmonary HTN  Right heart failure  Portal hypertension  Liver cirrhosis secondary to PANIAGUA  Coronary artery disease  CKD  Hypertension  Constipation  DVT prophylaxis with SCDs      Resume torsemide 20 mg bid; monitor bp  Nonsurgical treatment. Recommended WBAT with walker per ortho consult. PT/OT. Rec repeat xrays in 10-14 days  Telemetry  analgesics prn, decreased to 1 tablet  replace K prn  Antiemetics prn  Consult cardiology appreciated  Continue PHTN meds  Hold torsemide for now due to hypotension; can likely resume tomorrow. BP improved.  Cr at baseline   Home meds per clinical course  monitor labs   miralax daily to avoid constipation while on narcotics  PT/OT rec mod intensity     Discharge Medications   New acetaminophen-oxycodone (acetaminophen-oxycodone 325 mg-5 mg oral tablet = Percocet)1 Tabs Oral EVERY FOUR HOURS as needed Moderate Pain for 7 Days. Refills: 0. docusate (Colace 100 mg oral capsule)1 Capsules Oral DAILY. insulin regular (NovoLIN R 100 units/mL injectable solution)Mild Scale Subcutaneous WITH MEALS AND AT BEDTIME. << Sliding Scale Comments >>  0 Units; 151-200 2 Units; 201-250 4 Units; 251-300 6 Units; 301-350 8 Units; 351-400 10 Units; Greater than 400 12 Units << Sliding Scale Comments >>. pantoprazole (Protonix 40 mg oral delayed release tablet)1 Tabs Oral DAILY.   Changed polyethylene glycol 3350 (MiraLax oral powder for reconstitution)17 Gram Oral DAILY for 31 Days. dissolve in 4 to 8 oz of beverage. Refills: 3.   Unchanged  calcitriol (Rocaltrol 0.25 mcg oral capsule)1 Capsules Oral DAILY. ferrous sulfate ( iron ) (Slow Release Iron=Slow Fe 140 mg (45 mg elemental iron) oral tablet, extended release)1 Tabs Oral MON,WED,FRI. Refills: 3. finerenone (Kerendia 20 mg oral tablet)1 Tabs Oral DAILY. montelukast (Singulair 10 mg oral tablet)1 Tabs Oral EVERY EVENING. Refills: 3. multivitamin with minerals (PreserVision AREDS 2 oral capsule)1 Capsules Oral TWICE A DAY. potassium chloride (KCL) (Potassium Chloride (Eqv-Klor-Con M10) 10 mEq oral tablet, extended release)1 Tabs Oral DAILY. Prescription DME (accu check fredi plus strips)test once a day. Refills: 3. Prescription DME (accu echek softclix lancets)test once a day. Refills: 3. Prescription DME (HANDICAP PLACARD)DURATION OF 5 YEARS. Refills: 0. sildenafil = Revatio (sildenafil 20 mg oral tablet)2 Tabs Oral THREE TIMES A DAY. torsemide = Demadex (torsemide 20 mg oral tablet)1 Tabs Oral TWICE A DAY.   Discontinued glimepiride (glimepiride 1 mg oral tablet)1 Tabs Oral DAILY for 90 Days. replaces glipizide. Refills: 3. traMADol (traMADol 50 mg oral tablet)take ONE-HALF TABLET BY MOUTH EVERY 8 HOURS FOR 6 DAYS AS NEEDED FOR ARTHRITIS pain. Refills: 0. Hospital Course     79-year-old female with history of pulmonary hypertension, chronic thrombocytopenia, liver cirrhosis secondary to PANIAGUA with portal hypertension, coronary disease, chronic kidney disease and diabetes was admitted after mechanical fall at home sustaining left inferior pubic rami and sacral and acetabulum fractures. She was hypoxic after morphine in the ER requiring nasal cannula. She had an episode of vasovagal syncope on the floor after Dulcolax suppository was placed. Diuretics were held for a few days initialing of her hospitalization. Torsemide was eventually resumed. She was seen by orthopedic surgeon and allowed weightbearing as tolerated with walker. Therapy recommended skilled nursing facility and she was discharged  there.    Provide safe environment for the patient     Continue current medication regimen     OT PT and speech therapy     Bowel and bladder,skin care     Nutritional support     monitor and treat blood glucose     GI  and DVT prophylaxis     PRN medications     Periodic lab work    Regular Follow up    Charting is done using voice recognition software and may contain errors which have not been completely corrected        Source of history: Nurse, Medical personnel, Medical record, Patient.  History limitation: None.    HPI:  History and physical    Patient is unable to give any detailed history and therefore history is obtained from the chart  No acute complaints voiced by the patient or acute concerns raised by nursing    History of hospitalization-    Chief Complaint hip pain, chest tightness History of Present Illness 79 year old female was trying to put her walker away after physical therapy when it got away from her and in reaching for it she fell, landing onto her left hip. She had immediate pain and could not get up. She denied head injury, neck pain or loss of consciousness. She received morphine for pain and was complaining of chest tightness, dizziness, nausea and thirstiness when I saw her. Sats were in the upper 80's and improved to 90's on 2 liters nasal cannula and chest tightness had improved. She has chronic thrombocytopenia history and pulmonary htn and follows with Dr. Peace.     Problem List/Past Medical History Ongoing Acute-on-chronic kidney injury Adverse reaction to pneumococcal vaccine Anemia Arthritis Bruising tendency Cataract associated with type 2 diabetes mellitus Cataracts Cervical fusion syndrome Cervical spine arthritis CHF (congestive heart failure) Chronic constipation Chronic kidney disease Chronic kidney disease stage 4 Chronic sinusitis CKD (chronic kidney disease), stage IV Coronary artery disease Degenerative arthritis of thumb DISH (diffuse idiopathic skeletal  hyperostosis) Eosinophilic asthma Fall at home Gastroesophageal reflux disease Hearing loss High cholesterol History of adenomatous polyp of colon History of COVID-19 Impacted cerumen of right ear Intermittent asthma Leukopenia Liver cirrhosis secondary to PANIAGUA, advanced Loss of appetite Low income Lumbar and sacral spondyloarthritis Lumbar spinal stenosis Lumbar spondylosis Nocturnal hypoxemia Nonobstructive atherosclerosis of coronary artery Onychomycosis due to dermatophyte Osteoporosis Otalgia Pain of toes of bilateral feet Pancreatic lesion Parathyroid disease Pneumonia due to COVID-19 virus Polyneuropathy due to type 2 diabetes mellitus Portal hypertension Pulmonary arterial hypertension Right heart failure Right rotator cuff tendinitis 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[employer] Highest education:Some college   Exercise - Occasional exercise Times per week:1-2 times/week Exercise type:chair exercise class   Home/Environment Lives with:Alone *Living  Situation Prior to AdmissionHome/Independent Home equipment:None, Walker/Cane Monitoring Equipment in homeGlucose monitoring *Special Services and Community Resources Prior to AdmissionNone *Mobility Assistance Prior to AdmissionIndependent Home BarriersNone *Will patient require additional/new services upon discharge?No   Nutrition/Health Type of diet:Regular AppetiteFair Feeding AssistanceIndependent   Other Name:Daily Caffeine Details:Consumes on average 1 cup of regular coffee per day - alternating with tea Consumes on average 1 cup of hot tea per day - alternating with coffee Consumes on average 32oz of iced tea per day - alternating with soda Consumes on average 32oz of soda per   Substance Abuse - Denies Substance Abuse   Tobacco - Denies Tobacco Use Use:Never (less than 100 in lifetime) Family History Cardiac disease.: Mother. Gastric cancer: Brother. Heart attack.: Brother. Heart disease: Mother.Negative: Father. Heart failure.: Mother. Stroke.: Father. Health Status Family Member(s)  Family Member(s) Relationship: Father, Age: Unknown, Cause: Brain Hemorrhage Relationship: Mother, Age: Unknown   Current Outpatient Medications   Medication Sig Dispense Refill   • Accu-Chek Bozena Plus test strp strip      • Accu-Chek Softclix Lancets misc      • atenolol (Tenormin) 25 mg tablet  (Patient not taking: Reported on 2025)     • Constulose 10 gram/15 mL oral solution take 15 mL ORAL TWICE DAILY x30 days as needed for constipation (Patient not taking: Reported on 2025)     • Farxiga 10 mg 1 tablet (10 mg). (Patient not taking: Reported on 2025)     • finerenone (Kerendia) 10 mg tablet tablet Take 1 tablet (10 mg) by mouth once daily.     • furosemide (Lasix) 40 mg tablet Take 1 tablet (40 mg) by mouth once daily. (Patient not taking: Reported on 2025)     • glipiZIDE (Glucotrol) 5 mg tablet 1 tablet (5 mg).     • montelukast (Singulair) 10 mg tablet      • omeprazole (PriLOSEC) 20 mg   capsule Take 1 capsule (20 mg) by mouth once daily in the morning. Take before meals. Do not crush or chew. 90 capsule 3   • polyethylene glycol (Glycolax, Miralax) 17 gram/dose powder Take 17g ORAL DAILY for 30 days,Instrdissolve in water before taking.     • sildenafil (Revatio) 20 mg tablet Take 2 tablets (40 mg) by mouth 3 times a day.     • spironolactone (Aldactone) 50 mg tablet  (Patient not taking: Reported on 1/28/2025)     • torsemide (Demadex) 20 mg tablet        No current facility-administered medications for this visit.       Physical Exam:  Vital signs as per nursing/MA documentation were reviewed  General appearance: Alert and in no acute distress  HEENT: Normal Inspection  Neck - Normal Inspection  Respiratory : No respiratory distress. Lungs are clear   Cardiovascular: heart rate normal. No gallop  Back - normal inspection  Skin inspection:Warm  Musculoskeletal : No deformities  Neuro : Limited exam. Baseline    ROS: Review of symptoms is negative except for what is mentioned in HPI    Results/Data  Records including but not limited to electronic medical records, relevant lab work and imaging from patient's health care facility encounter were reviewed and independently verified      Charting was completed using voice recognition technology and may include unintended errors.    Discussed with patient/family, RN, and NP.    Electronically Signed By: Noe Mccartney MD   6/15/25  8:36 AM

## 2025-06-15 NOTE — PROGRESS NOTES
Kera Gonsales   80 y.o.  female  @location@            Assessment and Plan:      Mechanical fall with left inferior pubic rami and sacral and acetabulum fractures (Multiple pelvic fractures)  Vasovagal syncope after dulcolax suppository placed  Chest pain likely due to hypoxemia from IV narcotics  Hypotension due to narcotics  Hypokalemia  NIDDM  Chronic thrombocytopenia  Pulmonary HTN  Right heart failure  Portal hypertension  Liver cirrhosis secondary to PANIAGUA  Coronary artery disease  CKD  Hypertension  Constipation  DVT prophylaxis with SCDs      Resume torsemide 20 mg bid; monitor bp  Nonsurgical treatment. Recommended WBAT with walker per ortho consult. PT/OT. Rec repeat xrays in 10-14 days  Telemetry  analgesics prn, decreased to 1 tablet  replace K prn  Antiemetics prn  Consult cardiology appreciated  Continue PHTN meds  Hold torsemide for now due to hypotension; can likely resume tomorrow. BP improved.  Cr at baseline   Home meds per clinical course  monitor labs   miralax daily to avoid constipation while on narcotics  PT/OT rec mod intensity     Discharge Medications   New acetaminophen-oxycodone (acetaminophen-oxycodone 325 mg-5 mg oral tablet = Percocet)1 Tabs Oral EVERY FOUR HOURS as needed Moderate Pain for 7 Days. Refills: 0. docusate (Colace 100 mg oral capsule)1 Capsules Oral DAILY. insulin regular (NovoLIN R 100 units/mL injectable solution)Mild Scale Subcutaneous WITH MEALS AND AT BEDTIME. << Sliding Scale Comments >>  0 Units; 151-200 2 Units; 201-250 4 Units; 251-300 6 Units; 301-350 8 Units; 351-400 10 Units; Greater than 400 12 Units << Sliding Scale Comments >>. pantoprazole (Protonix 40 mg oral delayed release tablet)1 Tabs Oral DAILY.   Changed polyethylene glycol 3350 (MiraLax oral powder for reconstitution)17 Gram Oral DAILY for 31 Days. dissolve in 4 to 8 oz of beverage. Refills: 3.   Unchanged calcitriol (Rocaltrol 0.25 mcg oral capsule)1 Capsules Oral DAILY. ferrous  sulfate ( iron ) (Slow Release Iron=Slow Fe 140 mg (45 mg elemental iron) oral tablet, extended release)1 Tabs Oral MON,WED,FRI. Refills: 3. finerenone (Kerendia 20 mg oral tablet)1 Tabs Oral DAILY. montelukast (Singulair 10 mg oral tablet)1 Tabs Oral EVERY EVENING. Refills: 3. multivitamin with minerals (PreserVision AREDS 2 oral capsule)1 Capsules Oral TWICE A DAY. potassium chloride (KCL) (Potassium Chloride (Eqv-Klor-Con M10) 10 mEq oral tablet, extended release)1 Tabs Oral DAILY. Prescription DME (accu check fredi plus strips)test once a day. Refills: 3. Prescription DME (accu echek softclix lancets)test once a day. Refills: 3. Prescription DME (HANDICAP PLACARD)DURATION OF 5 YEARS. Refills: 0. sildenafil = Revatio (sildenafil 20 mg oral tablet)2 Tabs Oral THREE TIMES A DAY. torsemide = Demadex (torsemide 20 mg oral tablet)1 Tabs Oral TWICE A DAY.   Discontinued glimepiride (glimepiride 1 mg oral tablet)1 Tabs Oral DAILY for 90 Days. replaces glipizide. Refills: 3. traMADol (traMADol 50 mg oral tablet)take ONE-HALF TABLET BY MOUTH EVERY 8 HOURS FOR 6 DAYS AS NEEDED FOR ARTHRITIS pain. Refills: 0. Hospital Course     79-year-old female with history of pulmonary hypertension, chronic thrombocytopenia, liver cirrhosis secondary to PANIAGUA with portal hypertension, coronary disease, chronic kidney disease and diabetes was admitted after mechanical fall at home sustaining left inferior pubic rami and sacral and acetabulum fractures. She was hypoxic after morphine in the ER requiring nasal cannula. She had an episode of vasovagal syncope on the floor after Dulcolax suppository was placed. Diuretics were held for a few days initialing of her hospitalization. Torsemide was eventually resumed. She was seen by orthopedic surgeon and allowed weightbearing as tolerated with walker. Therapy recommended skilled nursing facility and she was discharged there.    Provide safe environment for the patient     Continue current  medication regimen     OT PT and speech therapy     Bowel and bladder,skin care     Nutritional support     monitor and treat blood glucose     GI  and DVT prophylaxis     PRN medications     Periodic lab work    Regular Follow up    Charting is done using voice recognition software and may contain errors which have not been completely corrected        Source of history: Nurse, Medical personnel, Medical record, Patient.  History limitation: None.    HPI:  History and physical    Patient is unable to give any detailed history and therefore history is obtained from the chart  No acute complaints voiced by the patient or acute concerns raised by nursing    History of hospitalization-    Chief Complaint hip pain, chest tightness History of Present Illness 79 year old female was trying to put her walker away after physical therapy when it got away from her and in reaching for it she fell, landing onto her left hip. She had immediate pain and could not get up. She denied head injury, neck pain or loss of consciousness. She received morphine for pain and was complaining of chest tightness, dizziness, nausea and thirstiness when I saw her. Sats were in the upper 80's and improved to 90's on 2 liters nasal cannula and chest tightness had improved. She has chronic thrombocytopenia history and pulmonary htn and follows with Dr. Peace.     Problem List/Past Medical History Ongoing Acute-on-chronic kidney injury Adverse reaction to pneumococcal vaccine Anemia Arthritis Bruising tendency Cataract associated with type 2 diabetes mellitus Cataracts Cervical fusion syndrome Cervical spine arthritis CHF (congestive heart failure) Chronic constipation Chronic kidney disease Chronic kidney disease stage 4 Chronic sinusitis CKD (chronic kidney disease), stage IV Coronary artery disease Degenerative arthritis of thumb DISH (diffuse idiopathic skeletal hyperostosis) Eosinophilic asthma Fall at home Gastroesophageal reflux disease  Hearing loss High cholesterol History of adenomatous polyp of colon History of COVID-19 Impacted cerumen of right ear Intermittent asthma Leukopenia Liver cirrhosis secondary to PANIAGUA, advanced Loss of appetite Low income Lumbar and sacral spondyloarthritis Lumbar spinal stenosis Lumbar spondylosis Nocturnal hypoxemia Nonobstructive atherosclerosis of coronary artery Onychomycosis due to dermatophyte Osteoporosis Otalgia Pain of toes of bilateral feet Pancreatic lesion Parathyroid disease Pneumonia due to COVID-19 virus Polyneuropathy due to type 2 diabetes mellitus Portal hypertension Pulmonary arterial hypertension Right heart failure Right rotator cuff tendinitis 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   Exercise - Occasional exercise Times per week:1-2 times/week Exercise type:chair exercise class   Home/Environment Lives with:Alone *Living Situation Prior to AdmissionHome/Independent Home equipment:None, Walker/Cane  Monitoring Equipment in homeGlucose monitoring *Special Services and Community Resources Prior to AdmissionNone *Mobility Assistance Prior to AdmissionIndependent Home BarriersNone *Will patient require additional/new services upon discharge?No   Nutrition/Health Type of diet:Regular AppetiteFair Feeding AssistanceIndependent   Other Name:Daily Caffeine Details:Consumes on average 1 cup of regular coffee per day - alternating with tea Consumes on average 1 cup of hot tea per day - alternating with coffee Consumes on average 32oz of iced tea per day - alternating with soda Consumes on average 32oz of soda per   Substance Abuse - Denies Substance Abuse   Tobacco - Denies Tobacco Use Use:Never (less than 100 in lifetime) Family History Cardiac disease.: Mother. Gastric cancer: Brother. Heart attack.: Brother. Heart disease: Mother.Negative: Father. Heart failure.: Mother. Stroke.: Father. Health Status Family Member(s)  Family Member(s) Relationship: Father, Age: Unknown, Cause: Brain Hemorrhage Relationship: Mother, Age: Unknown   Current Outpatient Medications   Medication Sig Dispense Refill    Accu-Chek Bozena Plus test strp strip       Accu-Chek Softclix Lancets misc       atenolol (Tenormin) 25 mg tablet  (Patient not taking: Reported on 2025)      Constulose 10 gram/15 mL oral solution take 15 mL ORAL TWICE DAILY x30 days as needed for constipation (Patient not taking: Reported on 2025)      Farxiga 10 mg 1 tablet (10 mg). (Patient not taking: Reported on 2025)      finerenone (Kerendia) 10 mg tablet tablet Take 1 tablet (10 mg) by mouth once daily.      furosemide (Lasix) 40 mg tablet Take 1 tablet (40 mg) by mouth once daily. (Patient not taking: Reported on 2025)      glipiZIDE (Glucotrol) 5 mg tablet 1 tablet (5 mg).      montelukast (Singulair) 10 mg tablet       omeprazole (PriLOSEC) 20 mg DR capsule Take 1 capsule (20 mg) by mouth once daily in the morning. Take before meals. Do  not crush or chew. 90 capsule 3    polyethylene glycol (Glycolax, Miralax) 17 gram/dose powder Take 17g ORAL DAILY for 30 days,Instrdissolve in water before taking.      sildenafil (Revatio) 20 mg tablet Take 2 tablets (40 mg) by mouth 3 times a day.      spironolactone (Aldactone) 50 mg tablet  (Patient not taking: Reported on 1/28/2025)      torsemide (Demadex) 20 mg tablet        No current facility-administered medications for this visit.       Physical Exam:  Vital signs as per nursing/MA documentation were reviewed  General appearance: Alert and in no acute distress  HEENT: Normal Inspection  Neck - Normal Inspection  Respiratory : No respiratory distress. Lungs are clear   Cardiovascular: heart rate normal. No gallop  Back - normal inspection  Skin inspection:Warm  Musculoskeletal : No deformities  Neuro : Limited exam. Baseline    ROS: Review of symptoms is negative except for what is mentioned in HPI    Results/Data  Records including but not limited to electronic medical records, relevant lab work and imaging from patient's health care facility encounter were reviewed and independently verified      Charting was completed using voice recognition technology and may include unintended errors.    Discussed with patient/family, RN, and NP.

## 2025-07-10 ENCOUNTER — NURSING HOME VISIT (OUTPATIENT)
Dept: POST ACUTE CARE | Facility: EXTERNAL LOCATION | Age: 80
End: 2025-07-10
Payer: MEDICARE

## 2025-07-10 DIAGNOSIS — E11.9 CONTROLLED TYPE 2 DIABETES MELLITUS WITHOUT COMPLICATION, WITHOUT LONG-TERM CURRENT USE OF INSULIN: ICD-10-CM

## 2025-07-10 DIAGNOSIS — N18.30 STAGE 3 CHRONIC KIDNEY DISEASE, UNSPECIFIED WHETHER STAGE 3A OR 3B CKD (MULTI): ICD-10-CM

## 2025-07-10 DIAGNOSIS — I50.33 ACUTE ON CHRONIC DIASTOLIC (CONGESTIVE) HEART FAILURE: ICD-10-CM

## 2025-07-10 DIAGNOSIS — I27.0 PRIMARY PULMONARY HYPERTENSION (MULTI): Primary | ICD-10-CM

## 2025-07-10 DIAGNOSIS — K75.81 NONALCOHOLIC STEATOHEPATITIS (NASH): ICD-10-CM

## 2025-07-10 PROCEDURE — 99309 SBSQ NF CARE MODERATE MDM 30: CPT | Performed by: EMERGENCY MEDICINE

## 2025-07-10 NOTE — LETTER
Patient: Kera Gonsales  : 1945    Encounter Date: 07/10/2025    Kera Gonsales   80 y.o.  female  @location@            Assessment and Plan:      Mechanical fall with left inferior pubic rami and sacral and acetabulum fractures (Multiple pelvic fractures)  Vasovagal syncope after dulcolax suppository placed  Chest pain likely due to hypoxemia from IV narcotics  Hypotension due to narcotics  Hypokalemia  NIDDM  Chronic thrombocytopenia  Pulmonary HTN  Right heart failure  Portal hypertension  Liver cirrhosis secondary to PANIAGUA  Coronary artery disease  CKD  Hypertension  Constipation  DVT prophylaxis with SCDs      Resume torsemide 20 mg bid; monitor bp  Nonsurgical treatment. Recommended WBAT with walker per ortho consult. PT/OT. Rec repeat xrays in 10-14 days  Telemetry  analgesics prn, decreased to 1 tablet  replace K prn  Antiemetics prn  Consult cardiology appreciated  Continue PHTN meds  Hold torsemide for now due to hypotension; can likely resume tomorrow. BP improved.  Cr at baseline   Home meds per clinical course  monitor labs   miralax daily to avoid constipation while on narcotics  PT/OT rec mod intensity     Discharge Medications   New acetaminophen-oxycodone (acetaminophen-oxycodone 325 mg-5 mg oral tablet = Percocet)1 Tabs Oral EVERY FOUR HOURS as needed Moderate Pain for 7 Days. Refills: 0. docusate (Colace 100 mg oral capsule)1 Capsules Oral DAILY. insulin regular (NovoLIN R 100 units/mL injectable solution)Mild Scale Subcutaneous WITH MEALS AND AT BEDTIME. << Sliding Scale Comments >>  0 Units; 151-200 2 Units; 201-250 4 Units; 251-300 6 Units; 301-350 8 Units; 351-400 10 Units; Greater than 400 12 Units << Sliding Scale Comments >>. pantoprazole (Protonix 40 mg oral delayed release tablet)1 Tabs Oral DAILY.   Changed polyethylene glycol 3350 (MiraLax oral powder for reconstitution)17 Gram Oral DAILY for 31 Days. dissolve in 4 to 8 oz of beverage. Refills: 3.   Unchanged  calcitriol (Rocaltrol 0.25 mcg oral capsule)1 Capsules Oral DAILY. ferrous sulfate ( iron ) (Slow Release Iron=Slow Fe 140 mg (45 mg elemental iron) oral tablet, extended release)1 Tabs Oral MON,WED,FRI. Refills: 3. finerenone (Kerendia 20 mg oral tablet)1 Tabs Oral DAILY. montelukast (Singulair 10 mg oral tablet)1 Tabs Oral EVERY EVENING. Refills: 3. multivitamin with minerals (PreserVision AREDS 2 oral capsule)1 Capsules Oral TWICE A DAY. potassium chloride (KCL) (Potassium Chloride (Eqv-Klor-Con M10) 10 mEq oral tablet, extended release)1 Tabs Oral DAILY. Prescription DME (accu check fredi plus strips)test once a day. Refills: 3. Prescription DME (accu echek softclix lancets)test once a day. Refills: 3. Prescription DME (HANDICAP PLACARD)DURATION OF 5 YEARS. Refills: 0. sildenafil = Revatio (sildenafil 20 mg oral tablet)2 Tabs Oral THREE TIMES A DAY. torsemide = Demadex (torsemide 20 mg oral tablet)1 Tabs Oral TWICE A DAY.   Discontinued glimepiride (glimepiride 1 mg oral tablet)1 Tabs Oral DAILY for 90 Days. replaces glipizide. Refills: 3. traMADol (traMADol 50 mg oral tablet)take ONE-HALF TABLET BY MOUTH EVERY 8 HOURS FOR 6 DAYS AS NEEDED FOR ARTHRITIS pain. Refills: 0. Hospital Course     79-year-old female with history of pulmonary hypertension, chronic thrombocytopenia, liver cirrhosis secondary to PANIAGUA with portal hypertension, coronary disease, chronic kidney disease and diabetes was admitted after mechanical fall at home sustaining left inferior pubic rami and sacral and acetabulum fractures. She was hypoxic after morphine in the ER requiring nasal cannula. She had an episode of vasovagal syncope on the floor after Dulcolax suppository was placed. Diuretics were held for a few days initialing of her hospitalization. Torsemide was eventually resumed. She was seen by orthopedic surgeon and allowed weightbearing as tolerated with walker. Therapy recommended skilled nursing facility and she was discharged  there.    Rx list reviewed.   PT and OT evaluation is in the process.   Routine safety measures, fall precautions, risk modification and alarm placement if needed for prevention of falls.   Skin care precautions, prevention of pressures sores at pressure points assessed.   Pt needs to be monitored frequently by nursing staff particularly at night time.   Any confusion, agitation or behavioural disturbance needs to be attended, as per home policy   rapid covid Ag assay need to be done, notify if positive.   If needed appropriate measures to be taken for alarm placements and assisted devices, pt was told not to get up and ambulate at night unless help and assist available at bedside,   labs will be done as per our routine protocol.   PO intake need to be monitored if consuming po.       Charting is done using voice recognition software and may contain errors which have not been completely corrected          Source of history: Nurse, Medical personnel, Medical record, Patient.  History limitation: None.    HPI:  History and physical    Patient is unable to give any detailed history and therefore history is obtained from the chart  No acute complaints voiced by the patient or acute concerns raised by nursing    History of hospitalization-    Chief Complaint hip pain, chest tightness History of Present Illness 79 year old female was trying to put her walker away after physical therapy when it got away from her and in reaching for it she fell, landing onto her left hip. She had immediate pain and could not get up. She denied head injury, neck pain or loss of consciousness. She received morphine for pain and was complaining of chest tightness, dizziness, nausea and thirstiness when I saw her. Sats were in the upper 80's and improved to 90's on 2 liters nasal cannula and chest tightness had improved. She has chronic thrombocytopenia history and pulmonary htn and follows with Dr. Peace.     Problem List/Past Medical  History Ongoing Acute-on-chronic kidney injury Adverse reaction to pneumococcal vaccine Anemia Arthritis Bruising tendency Cataract associated with type 2 diabetes mellitus Cataracts Cervical fusion syndrome Cervical spine arthritis CHF (congestive heart failure) Chronic constipation Chronic kidney disease Chronic kidney disease stage 4 Chronic sinusitis CKD (chronic kidney disease), stage IV Coronary artery disease Degenerative arthritis of thumb DISH (diffuse idiopathic skeletal hyperostosis) Eosinophilic asthma Fall at home Gastroesophageal reflux disease Hearing loss High cholesterol History of adenomatous polyp of colon History of COVID-19 Impacted cerumen of right ear Intermittent asthma Leukopenia Liver cirrhosis secondary to PANIAGUA, advanced Loss of appetite Low income Lumbar and sacral spondyloarthritis Lumbar spinal stenosis Lumbar spondylosis Nocturnal hypoxemia Nonobstructive atherosclerosis of coronary artery Onychomycosis due to dermatophyte Osteoporosis Otalgia Pain of toes of bilateral feet Pancreatic lesion Parathyroid disease Pneumonia due to COVID-19 virus Polyneuropathy due to type 2 diabetes mellitus Portal hypertension Pulmonary arterial hypertension Right heart failure Right rotator cuff tendinitis 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[employer] Highest education:Some college   Exercise - Occasional exercise Times per week:1-2 times/week Exercise type:chair exercise class   Home/Environment Lives with:Alone *Living Situation Prior to AdmissionHome/Independent Home equipment:None, Walker/Cane Monitoring Equipment in homeGlucose monitoring *Special Services and Community Resources Prior to AdmissionNone *Mobility Assistance Prior to AdmissionIndependent Home BarriersNone *Will patient require additional/new services upon discharge?No   Nutrition/Health Type of diet:Regular AppetiteFair Feeding AssistanceIndependent   Other Name:Daily Caffeine Details:Consumes on average 1 cup of regular coffee per day - alternating with tea Consumes on average 1 cup of hot tea per day - alternating with coffee Consumes on average 32oz of iced tea per day - alternating with soda Consumes on average 32oz of soda per   Substance Abuse - Denies Substance Abuse   Tobacco - Denies Tobacco Use Use:Never (less than 100 in lifetime) Family History Cardiac disease.: Mother. Gastric cancer: Brother. Heart attack.: Brother. Heart disease: Mother.Negative: Father. Heart failure.: Mother. Stroke.: Father. Health Status Family Member(s)  Family Member(s) Relationship: Father, Age: Unknown, Cause: Brain Hemorrhage Relationship: Mother, Age: Unknown   Current Outpatient Medications   Medication Sig Dispense Refill   • Accu-Chek Bozena Plus test strp strip      • Accu-Chek Softclix Lancets misc      • atenolol (Tenormin) 25 mg tablet  (Patient not taking: Reported on 2025)     • Constulose 10 gram/15 mL oral solution take 15 mL ORAL TWICE DAILY x30 days as needed for  constipation (Patient not taking: Reported on 1/28/2025)     • Farxiga 10 mg 1 tablet (10 mg). (Patient not taking: Reported on 1/28/2025)     • finerenone (Kerendia) 10 mg tablet tablet Take 1 tablet (10 mg) by mouth once daily.     • furosemide (Lasix) 40 mg tablet Take 1 tablet (40 mg) by mouth once daily. (Patient not taking: Reported on 1/28/2025)     • glipiZIDE (Glucotrol) 5 mg tablet 1 tablet (5 mg).     • montelukast (Singulair) 10 mg tablet      • omeprazole (PriLOSEC) 20 mg DR capsule Take 1 capsule (20 mg) by mouth once daily in the morning. Take before meals. Do not crush or chew. 90 capsule 3   • polyethylene glycol (Glycolax, Miralax) 17 gram/dose powder Take 17g ORAL DAILY for 30 days,Instrdissolve in water before taking.     • sildenafil (Revatio) 20 mg tablet Take 2 tablets (40 mg) by mouth 3 times a day.     • spironolactone (Aldactone) 50 mg tablet  (Patient not taking: Reported on 1/28/2025)     • torsemide (Demadex) 20 mg tablet        No current facility-administered medications for this visit.       Physical Exam:  Vital signs as per nursing/MA documentation were reviewed  General appearance: Alert and in no acute distress  HEENT: Normal Inspection  Neck - Normal Inspection  Respiratory : No respiratory distress. Lungs are clear   Cardiovascular: heart rate normal. No gallop  Back - normal inspection  Skin inspection:Warm  Musculoskeletal : No deformities  Neuro : Limited exam. Baseline    ROS: Review of symptoms is negative except for what is mentioned in HPI    Results/Data  Records including but not limited to electronic medical records, relevant lab work and imaging from patient's health care facility encounter were reviewed and independently verified      Charting was completed using voice recognition technology and may include unintended errors.    Discussed with patient/family, RN, and NP.    Electronically Signed By: Noe Mccartney MD   7/12/25  9:24 AM

## 2025-07-12 NOTE — PROGRESS NOTES
Kera Gonsales   80 y.o.  female  @location@            Assessment and Plan:      Mechanical fall with left inferior pubic rami and sacral and acetabulum fractures (Multiple pelvic fractures)  Vasovagal syncope after dulcolax suppository placed  Chest pain likely due to hypoxemia from IV narcotics  Hypotension due to narcotics  Hypokalemia  NIDDM  Chronic thrombocytopenia  Pulmonary HTN  Right heart failure  Portal hypertension  Liver cirrhosis secondary to PANIAGUA  Coronary artery disease  CKD  Hypertension  Constipation  DVT prophylaxis with SCDs      Resume torsemide 20 mg bid; monitor bp  Nonsurgical treatment. Recommended WBAT with walker per ortho consult. PT/OT. Rec repeat xrays in 10-14 days  Telemetry  analgesics prn, decreased to 1 tablet  replace K prn  Antiemetics prn  Consult cardiology appreciated  Continue PHTN meds  Hold torsemide for now due to hypotension; can likely resume tomorrow. BP improved.  Cr at baseline   Home meds per clinical course  monitor labs   miralax daily to avoid constipation while on narcotics  PT/OT rec mod intensity     Discharge Medications   New acetaminophen-oxycodone (acetaminophen-oxycodone 325 mg-5 mg oral tablet = Percocet)1 Tabs Oral EVERY FOUR HOURS as needed Moderate Pain for 7 Days. Refills: 0. docusate (Colace 100 mg oral capsule)1 Capsules Oral DAILY. insulin regular (NovoLIN R 100 units/mL injectable solution)Mild Scale Subcutaneous WITH MEALS AND AT BEDTIME. << Sliding Scale Comments >>  0 Units; 151-200 2 Units; 201-250 4 Units; 251-300 6 Units; 301-350 8 Units; 351-400 10 Units; Greater than 400 12 Units << Sliding Scale Comments >>. pantoprazole (Protonix 40 mg oral delayed release tablet)1 Tabs Oral DAILY.   Changed polyethylene glycol 3350 (MiraLax oral powder for reconstitution)17 Gram Oral DAILY for 31 Days. dissolve in 4 to 8 oz of beverage. Refills: 3.   Unchanged calcitriol (Rocaltrol 0.25 mcg oral capsule)1 Capsules Oral DAILY. ferrous  sulfate ( iron ) (Slow Release Iron=Slow Fe 140 mg (45 mg elemental iron) oral tablet, extended release)1 Tabs Oral MON,WED,FRI. Refills: 3. finerenone (Kerendia 20 mg oral tablet)1 Tabs Oral DAILY. montelukast (Singulair 10 mg oral tablet)1 Tabs Oral EVERY EVENING. Refills: 3. multivitamin with minerals (PreserVision AREDS 2 oral capsule)1 Capsules Oral TWICE A DAY. potassium chloride (KCL) (Potassium Chloride (Eqv-Klor-Con M10) 10 mEq oral tablet, extended release)1 Tabs Oral DAILY. Prescription DME (accu check fredi plus strips)test once a day. Refills: 3. Prescription DME (accu echek softclix lancets)test once a day. Refills: 3. Prescription DME (HANDICAP PLACARD)DURATION OF 5 YEARS. Refills: 0. sildenafil = Revatio (sildenafil 20 mg oral tablet)2 Tabs Oral THREE TIMES A DAY. torsemide = Demadex (torsemide 20 mg oral tablet)1 Tabs Oral TWICE A DAY.   Discontinued glimepiride (glimepiride 1 mg oral tablet)1 Tabs Oral DAILY for 90 Days. replaces glipizide. Refills: 3. traMADol (traMADol 50 mg oral tablet)take ONE-HALF TABLET BY MOUTH EVERY 8 HOURS FOR 6 DAYS AS NEEDED FOR ARTHRITIS pain. Refills: 0. Hospital Course     79-year-old female with history of pulmonary hypertension, chronic thrombocytopenia, liver cirrhosis secondary to PANIAGUA with portal hypertension, coronary disease, chronic kidney disease and diabetes was admitted after mechanical fall at home sustaining left inferior pubic rami and sacral and acetabulum fractures. She was hypoxic after morphine in the ER requiring nasal cannula. She had an episode of vasovagal syncope on the floor after Dulcolax suppository was placed. Diuretics were held for a few days initialing of her hospitalization. Torsemide was eventually resumed. She was seen by orthopedic surgeon and allowed weightbearing as tolerated with walker. Therapy recommended skilled nursing facility and she was discharged there.    Rx list reviewed.   PT and OT evaluation is in the process.    Routine safety measures, fall precautions, risk modification and alarm placement if needed for prevention of falls.   Skin care precautions, prevention of pressures sores at pressure points assessed.   Pt needs to be monitored frequently by nursing staff particularly at night time.   Any confusion, agitation or behavioural disturbance needs to be attended, as per home policy   rapid covid Ag assay need to be done, notify if positive.   If needed appropriate measures to be taken for alarm placements and assisted devices, pt was told not to get up and ambulate at night unless help and assist available at bedside,   labs will be done as per our routine protocol.   PO intake need to be monitored if consuming po.       Charting is done using voice recognition software and may contain errors which have not been completely corrected          Source of history: Nurse, Medical personnel, Medical record, Patient.  History limitation: None.    HPI:  History and physical    Patient is unable to give any detailed history and therefore history is obtained from the chart  No acute complaints voiced by the patient or acute concerns raised by nursing    History of hospitalization-    Chief Complaint hip pain, chest tightness History of Present Illness 79 year old female was trying to put her walker away after physical therapy when it got away from her and in reaching for it she fell, landing onto her left hip. She had immediate pain and could not get up. She denied head injury, neck pain or loss of consciousness. She received morphine for pain and was complaining of chest tightness, dizziness, nausea and thirstiness when I saw her. Sats were in the upper 80's and improved to 90's on 2 liters nasal cannula and chest tightness had improved. She has chronic thrombocytopenia history and pulmonary htn and follows with Dr. Peace.     Problem List/Past Medical History Ongoing Acute-on-chronic kidney injury Adverse reaction to  pneumococcal vaccine Anemia Arthritis Bruising tendency Cataract associated with type 2 diabetes mellitus Cataracts Cervical fusion syndrome Cervical spine arthritis CHF (congestive heart failure) Chronic constipation Chronic kidney disease Chronic kidney disease stage 4 Chronic sinusitis CKD (chronic kidney disease), stage IV Coronary artery disease Degenerative arthritis of thumb DISH (diffuse idiopathic skeletal hyperostosis) Eosinophilic asthma Fall at home Gastroesophageal reflux disease Hearing loss High cholesterol History of adenomatous polyp of colon History of COVID-19 Impacted cerumen of right ear Intermittent asthma Leukopenia Liver cirrhosis secondary to PANIAGUA, advanced Loss of appetite Low income Lumbar and sacral spondyloarthritis Lumbar spinal stenosis Lumbar spondylosis Nocturnal hypoxemia Nonobstructive atherosclerosis of coronary artery Onychomycosis due to dermatophyte Osteoporosis Otalgia Pain of toes of bilateral feet Pancreatic lesion Parathyroid disease Pneumonia due to COVID-19 virus Polyneuropathy due to type 2 diabetes mellitus Portal hypertension Pulmonary arterial hypertension Right heart failure Right rotator cuff tendinitis 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   Exercise - Occasional exercise Times per week:1-2 times/week Exercise type:chair exercise class   Home/Environment Lives with:Alone *Living Situation Prior to AdmissionHome/Independent Home equipment:None, Walker/Cane Monitoring Equipment in homeGlucose monitoring *Special Services and Community Resources Prior to AdmissionNone *Mobility Assistance Prior to AdmissionIndependent Home BarriersNone *Will patient require additional/new services upon discharge?No   Nutrition/Health Type of diet:Regular AppetiteFair Feeding AssistanceIndependent   Other Name:Daily Caffeine Details:Consumes on average 1 cup of regular coffee per day - alternating with tea Consumes on average 1 cup of hot tea per day - alternating with coffee Consumes on average 32oz of iced tea per day - alternating with soda Consumes on average 32oz of soda per   Substance Abuse - Denies Substance Abuse   Tobacco - Denies Tobacco Use Use:Never (less than 100 in lifetime) Family History Cardiac disease.: Mother. Gastric cancer: Brother. Heart attack.: Brother. Heart disease: Mother.Negative: Father. Heart failure.: Mother. Stroke.: Father. Health Status Family Member(s)  Family Member(s) Relationship: Father, Age: Unknown, Cause: Brain Hemorrhage Relationship: Mother, Age: Unknown   Current Outpatient Medications   Medication Sig Dispense Refill    Accu-Chek Bozena Plus test strp strip       Accu-Chek Softclix Lancets misc       atenolol (Tenormin) 25 mg tablet  (Patient not taking: Reported on 2025)      Constulose 10 gram/15 mL oral solution take 15 mL ORAL TWICE DAILY x30 days as needed for constipation (Patient not taking: Reported on 2025)      Farxiga 10  mg 1 tablet (10 mg). (Patient not taking: Reported on 1/28/2025)      finerenone (Kerendia) 10 mg tablet tablet Take 1 tablet (10 mg) by mouth once daily.      furosemide (Lasix) 40 mg tablet Take 1 tablet (40 mg) by mouth once daily. (Patient not taking: Reported on 1/28/2025)      glipiZIDE (Glucotrol) 5 mg tablet 1 tablet (5 mg).      montelukast (Singulair) 10 mg tablet       omeprazole (PriLOSEC) 20 mg DR capsule Take 1 capsule (20 mg) by mouth once daily in the morning. Take before meals. Do not crush or chew. 90 capsule 3    polyethylene glycol (Glycolax, Miralax) 17 gram/dose powder Take 17g ORAL DAILY for 30 days,Instrdissolve in water before taking.      sildenafil (Revatio) 20 mg tablet Take 2 tablets (40 mg) by mouth 3 times a day.      spironolactone (Aldactone) 50 mg tablet  (Patient not taking: Reported on 1/28/2025)      torsemide (Demadex) 20 mg tablet        No current facility-administered medications for this visit.       Physical Exam:  Vital signs as per nursing/MA documentation were reviewed  General appearance: Alert and in no acute distress  HEENT: Normal Inspection  Neck - Normal Inspection  Respiratory : No respiratory distress. Lungs are clear   Cardiovascular: heart rate normal. No gallop  Back - normal inspection  Skin inspection:Warm  Musculoskeletal : No deformities  Neuro : Limited exam. Baseline    ROS: Review of symptoms is negative except for what is mentioned in HPI    Results/Data  Records including but not limited to electronic medical records, relevant lab work and imaging from patient's health care facility encounter were reviewed and independently verified      Charting was completed using voice recognition technology and may include unintended errors.    Discussed with patient/family, RN, and NP.

## 2025-08-12 ENCOUNTER — APPOINTMENT (OUTPATIENT)
Dept: GASTROENTEROLOGY | Facility: CLINIC | Age: 80
End: 2025-08-12
Payer: MEDICARE